# Patient Record
Sex: FEMALE | Race: WHITE | NOT HISPANIC OR LATINO | Employment: UNEMPLOYED | URBAN - METROPOLITAN AREA
[De-identification: names, ages, dates, MRNs, and addresses within clinical notes are randomized per-mention and may not be internally consistent; named-entity substitution may affect disease eponyms.]

---

## 2023-07-31 ENCOUNTER — HOSPITAL ENCOUNTER (INPATIENT)
Facility: MEDICAL CENTER | Age: 28
LOS: 3 days | DRG: 395 | End: 2023-08-03
Attending: HOSPITALIST | Admitting: HOSPITALIST
Payer: COMMERCIAL

## 2023-07-31 PROBLEM — F32.9 REACTIVE DEPRESSION: Status: ACTIVE | Noted: 2023-07-31

## 2023-07-31 PROBLEM — K80.50 CHOLEDOCHOLITHIASIS: Status: ACTIVE | Noted: 2023-07-31

## 2023-07-31 PROBLEM — E66.9 OBESITY (BMI 35.0-39.9 WITHOUT COMORBIDITY): Status: ACTIVE | Noted: 2023-07-31

## 2023-07-31 LAB — LIPASE SERPL-CCNC: >3000 U/L (ref 11–82)

## 2023-07-31 PROCEDURE — A9270 NON-COVERED ITEM OR SERVICE: HCPCS | Performed by: HOSPITALIST

## 2023-07-31 PROCEDURE — 99223 1ST HOSP IP/OBS HIGH 75: CPT | Performed by: HOSPITALIST

## 2023-07-31 PROCEDURE — 770001 HCHG ROOM/CARE - MED/SURG/GYN PRIV*

## 2023-07-31 PROCEDURE — 94760 N-INVAS EAR/PLS OXIMETRY 1: CPT

## 2023-07-31 PROCEDURE — 36415 COLL VENOUS BLD VENIPUNCTURE: CPT

## 2023-07-31 PROCEDURE — 770006 HCHG ROOM/CARE - MED/SURG/GYN SEMI*

## 2023-07-31 PROCEDURE — 83690 ASSAY OF LIPASE: CPT

## 2023-07-31 PROCEDURE — 700102 HCHG RX REV CODE 250 W/ 637 OVERRIDE(OP): Performed by: HOSPITALIST

## 2023-07-31 PROCEDURE — 700105 HCHG RX REV CODE 258: Performed by: HOSPITALIST

## 2023-07-31 PROCEDURE — 700111 HCHG RX REV CODE 636 W/ 250 OVERRIDE (IP): Mod: JZ | Performed by: INTERNAL MEDICINE

## 2023-07-31 PROCEDURE — 700111 HCHG RX REV CODE 636 W/ 250 OVERRIDE (IP): Performed by: HOSPITALIST

## 2023-07-31 RX ORDER — BISACODYL 10 MG
10 SUPPOSITORY, RECTAL RECTAL
Status: DISCONTINUED | OUTPATIENT
Start: 2023-07-31 | End: 2023-08-02

## 2023-07-31 RX ORDER — PROMETHAZINE HYDROCHLORIDE 25 MG/1
12.5-25 SUPPOSITORY RECTAL EVERY 4 HOURS PRN
Status: DISCONTINUED | OUTPATIENT
Start: 2023-07-31 | End: 2023-08-03 | Stop reason: HOSPADM

## 2023-07-31 RX ORDER — LABETALOL HYDROCHLORIDE 5 MG/ML
10 INJECTION, SOLUTION INTRAVENOUS EVERY 4 HOURS PRN
Status: DISCONTINUED | OUTPATIENT
Start: 2023-07-31 | End: 2023-08-03 | Stop reason: HOSPADM

## 2023-07-31 RX ORDER — ONDANSETRON 2 MG/ML
4 INJECTION INTRAMUSCULAR; INTRAVENOUS EVERY 4 HOURS PRN
Status: DISCONTINUED | OUTPATIENT
Start: 2023-07-31 | End: 2023-08-03 | Stop reason: HOSPADM

## 2023-07-31 RX ORDER — HYDROMORPHONE HYDROCHLORIDE 1 MG/ML
0.5 INJECTION, SOLUTION INTRAMUSCULAR; INTRAVENOUS; SUBCUTANEOUS
Status: DISCONTINUED | OUTPATIENT
Start: 2023-07-31 | End: 2023-08-03 | Stop reason: HOSPADM

## 2023-07-31 RX ORDER — MORPHINE SULFATE 4 MG/ML
4 INJECTION INTRAVENOUS
Status: DISCONTINUED | OUTPATIENT
Start: 2023-07-31 | End: 2023-07-31

## 2023-07-31 RX ORDER — SODIUM CHLORIDE 9 MG/ML
INJECTION, SOLUTION INTRAVENOUS CONTINUOUS
Status: DISCONTINUED | OUTPATIENT
Start: 2023-07-31 | End: 2023-08-02

## 2023-07-31 RX ORDER — OXYCODONE HYDROCHLORIDE 5 MG/1
5 TABLET ORAL
Status: DISCONTINUED | OUTPATIENT
Start: 2023-07-31 | End: 2023-08-03 | Stop reason: HOSPADM

## 2023-07-31 RX ORDER — MORPHINE SULFATE 4 MG/ML
4 INJECTION INTRAVENOUS ONCE
Status: COMPLETED | OUTPATIENT
Start: 2023-07-31 | End: 2023-07-31

## 2023-07-31 RX ORDER — OXYCODONE HYDROCHLORIDE 10 MG/1
10 TABLET ORAL
Status: DISCONTINUED | OUTPATIENT
Start: 2023-07-31 | End: 2023-08-03 | Stop reason: HOSPADM

## 2023-07-31 RX ORDER — PROMETHAZINE HYDROCHLORIDE 25 MG/1
12.5-25 TABLET ORAL EVERY 4 HOURS PRN
Status: DISCONTINUED | OUTPATIENT
Start: 2023-07-31 | End: 2023-08-03 | Stop reason: HOSPADM

## 2023-07-31 RX ORDER — PROCHLORPERAZINE EDISYLATE 5 MG/ML
5-10 INJECTION INTRAMUSCULAR; INTRAVENOUS EVERY 4 HOURS PRN
Status: DISCONTINUED | OUTPATIENT
Start: 2023-07-31 | End: 2023-08-03 | Stop reason: HOSPADM

## 2023-07-31 RX ORDER — POLYETHYLENE GLYCOL 3350 17 G/17G
1 POWDER, FOR SOLUTION ORAL
Status: DISCONTINUED | OUTPATIENT
Start: 2023-07-31 | End: 2023-08-02

## 2023-07-31 RX ORDER — ONDANSETRON 4 MG/1
4 TABLET, ORALLY DISINTEGRATING ORAL EVERY 4 HOURS PRN
Status: DISCONTINUED | OUTPATIENT
Start: 2023-07-31 | End: 2023-08-03 | Stop reason: HOSPADM

## 2023-07-31 RX ORDER — AMOXICILLIN 250 MG
2 CAPSULE ORAL 2 TIMES DAILY
Status: DISCONTINUED | OUTPATIENT
Start: 2023-07-31 | End: 2023-08-02

## 2023-07-31 RX ADMIN — SODIUM CHLORIDE: 9 INJECTION, SOLUTION INTRAVENOUS at 18:36

## 2023-07-31 RX ADMIN — MORPHINE SULFATE 4 MG: 4 INJECTION INTRAVENOUS at 18:31

## 2023-07-31 RX ADMIN — HYDROMORPHONE HYDROCHLORIDE 0.5 MG: 1 INJECTION, SOLUTION INTRAMUSCULAR; INTRAVENOUS; SUBCUTANEOUS at 22:41

## 2023-07-31 RX ADMIN — ONDANSETRON 4 MG: 2 INJECTION INTRAMUSCULAR; INTRAVENOUS at 19:46

## 2023-07-31 RX ADMIN — OXYCODONE HYDROCHLORIDE 10 MG: 10 TABLET ORAL at 21:05

## 2023-07-31 ASSESSMENT — ENCOUNTER SYMPTOMS
NERVOUS/ANXIOUS: 0
CONSTIPATION: 0
LOSS OF CONSCIOUSNESS: 0
CARDIOVASCULAR NEGATIVE: 1
VOMITING: 0
EYES NEGATIVE: 1
NAUSEA: 1
DIZZINESS: 0
RESPIRATORY NEGATIVE: 1
CONSTITUTIONAL NEGATIVE: 1
PALPITATIONS: 0
DIARRHEA: 0
HEADACHES: 1
FEVER: 0
DOUBLE VISION: 0
SEIZURES: 0
HEMOPTYSIS: 0
PSYCHIATRIC NEGATIVE: 1
FOCAL WEAKNESS: 0
HEARTBURN: 0
MUSCULOSKELETAL NEGATIVE: 1
BLOOD IN STOOL: 0
WHEEZING: 0
ABDOMINAL PAIN: 1
COUGH: 0
CHILLS: 0
BRUISES/BLEEDS EASILY: 0
DIAPHORESIS: 0

## 2023-07-31 ASSESSMENT — LIFESTYLE VARIABLES
ALCOHOL_USE: NO
HAVE YOU EVER FELT YOU SHOULD CUT DOWN ON YOUR DRINKING: NO
EVER FELT BAD OR GUILTY ABOUT YOUR DRINKING: NO
TOTAL SCORE: 0
HOW MANY TIMES IN THE PAST YEAR HAVE YOU HAD 5 OR MORE DRINKS IN A DAY: 0
CONSUMPTION TOTAL: NEGATIVE
AVERAGE NUMBER OF DAYS PER WEEK YOU HAVE A DRINK CONTAINING ALCOHOL: 0
TOTAL SCORE: 0
HAVE PEOPLE ANNOYED YOU BY CRITICIZING YOUR DRINKING: NO
TOTAL SCORE: 0
ON A TYPICAL DAY WHEN YOU DRINK ALCOHOL HOW MANY DRINKS DO YOU HAVE: 0
EVER HAD A DRINK FIRST THING IN THE MORNING TO STEADY YOUR NERVES TO GET RID OF A HANGOVER: NO

## 2023-07-31 ASSESSMENT — COGNITIVE AND FUNCTIONAL STATUS - GENERAL
SUGGESTED CMS G CODE MODIFIER DAILY ACTIVITY: CH
MOVING FROM LYING ON BACK TO SITTING ON SIDE OF FLAT BED: A LITTLE
STANDING UP FROM CHAIR USING ARMS: A LITTLE
SUGGESTED CMS G CODE MODIFIER MOBILITY: CK
CLIMB 3 TO 5 STEPS WITH RAILING: A LITTLE
MOBILITY SCORE: 19
WALKING IN HOSPITAL ROOM: A LITTLE
MOVING TO AND FROM BED TO CHAIR: A LITTLE
DAILY ACTIVITIY SCORE: 24

## 2023-07-31 ASSESSMENT — PATIENT HEALTH QUESTIONNAIRE - PHQ9
2. FEELING DOWN, DEPRESSED, IRRITABLE, OR HOPELESS: NOT AT ALL
1. LITTLE INTEREST OR PLEASURE IN DOING THINGS: NOT AT ALL
SUM OF ALL RESPONSES TO PHQ9 QUESTIONS 1 AND 2: 0

## 2023-07-31 ASSESSMENT — PAIN DESCRIPTION - PAIN TYPE
TYPE: SURGICAL PAIN

## 2023-07-31 ASSESSMENT — VISUAL ACUITY: OU: 1

## 2023-07-31 NOTE — PROGRESS NOTES
Triage Officer Note    Healthy 27yo s/p lap isamar with intra-op cholangiogram today at [facility] by Dr ROLAND Damico.  Found to have 1cm retained CBD stone.  Being sent for GI/ERCP

## 2023-08-01 ENCOUNTER — ANESTHESIA EVENT (OUTPATIENT)
Dept: SURGERY | Facility: MEDICAL CENTER | Age: 28
DRG: 395 | End: 2023-08-01
Payer: COMMERCIAL

## 2023-08-01 ENCOUNTER — APPOINTMENT (OUTPATIENT)
Dept: RADIOLOGY | Facility: MEDICAL CENTER | Age: 28
DRG: 395 | End: 2023-08-01
Attending: INTERNAL MEDICINE
Payer: COMMERCIAL

## 2023-08-01 ENCOUNTER — ANESTHESIA (OUTPATIENT)
Dept: SURGERY | Facility: MEDICAL CENTER | Age: 28
DRG: 395 | End: 2023-08-01
Payer: COMMERCIAL

## 2023-08-01 PROBLEM — R74.8 ELEVATED LIPASE: Status: ACTIVE | Noted: 2023-08-01

## 2023-08-01 PROBLEM — D72.829 LEUKOCYTOSIS: Status: ACTIVE | Noted: 2023-08-01

## 2023-08-01 LAB
ALBUMIN SERPL BCP-MCNC: 3.7 G/DL (ref 3.2–4.9)
ALBUMIN/GLOB SERPL: 1.3 G/DL
ALP SERPL-CCNC: 163 U/L (ref 30–99)
ALT SERPL-CCNC: 110 U/L (ref 2–50)
ANION GAP SERPL CALC-SCNC: 11 MMOL/L (ref 7–16)
AST SERPL-CCNC: 127 U/L (ref 12–45)
BILIRUB SERPL-MCNC: 3.9 MG/DL (ref 0.1–1.5)
BUN SERPL-MCNC: 6 MG/DL (ref 8–22)
CALCIUM ALBUM COR SERPL-MCNC: 8.6 MG/DL (ref 8.5–10.5)
CALCIUM SERPL-MCNC: 8.4 MG/DL (ref 8.4–10.2)
CHLORIDE SERPL-SCNC: 107 MMOL/L (ref 96–112)
CO2 SERPL-SCNC: 23 MMOL/L (ref 20–33)
CREAT SERPL-MCNC: 0.4 MG/DL (ref 0.5–1.4)
ERYTHROCYTE [DISTWIDTH] IN BLOOD BY AUTOMATED COUNT: 41.1 FL (ref 35.9–50)
ERYTHROCYTE [DISTWIDTH] IN BLOOD BY AUTOMATED COUNT: 43.1 FL (ref 35.9–50)
GFR SERPLBLD CREATININE-BSD FMLA CKD-EPI: 137 ML/MIN/1.73 M 2
GLOBULIN SER CALC-MCNC: 2.8 G/DL (ref 1.9–3.5)
GLUCOSE SERPL-MCNC: 118 MG/DL (ref 65–99)
HCG UR QL: NEGATIVE
HCT VFR BLD AUTO: 37.8 % (ref 37–47)
HCT VFR BLD AUTO: 38.6 % (ref 37–47)
HGB BLD-MCNC: 11.6 G/DL (ref 12–16)
HGB BLD-MCNC: 12.2 G/DL (ref 12–16)
MCH RBC QN AUTO: 26.7 PG (ref 27–33)
MCH RBC QN AUTO: 27 PG (ref 27–33)
MCHC RBC AUTO-ENTMCNC: 30.7 G/DL (ref 32.2–35.5)
MCHC RBC AUTO-ENTMCNC: 31.6 G/DL (ref 32.2–35.5)
MCV RBC AUTO: 84.5 FL (ref 81.4–97.8)
MCV RBC AUTO: 88.1 FL (ref 81.4–97.8)
PLATELET # BLD AUTO: 215 K/UL (ref 164–446)
PLATELET # BLD AUTO: 268 K/UL (ref 164–446)
PMV BLD AUTO: 10.2 FL (ref 9–12.9)
PMV BLD AUTO: 10.7 FL (ref 9–12.9)
POTASSIUM SERPL-SCNC: 4.5 MMOL/L (ref 3.6–5.5)
PROT SERPL-MCNC: 6.5 G/DL (ref 6–8.2)
RBC # BLD AUTO: 4.29 M/UL (ref 4.2–5.4)
RBC # BLD AUTO: 4.57 M/UL (ref 4.2–5.4)
SODIUM SERPL-SCNC: 141 MMOL/L (ref 135–145)
WBC # BLD AUTO: 12.1 K/UL (ref 4.8–10.8)
WBC # BLD AUTO: 14.4 K/UL (ref 4.8–10.8)

## 2023-08-01 PROCEDURE — 36415 COLL VENOUS BLD VENIPUNCTURE: CPT

## 2023-08-01 PROCEDURE — 80053 COMPREHEN METABOLIC PANEL: CPT

## 2023-08-01 PROCEDURE — 700105 HCHG RX REV CODE 258: Performed by: HOSPITALIST

## 2023-08-01 PROCEDURE — 700111 HCHG RX REV CODE 636 W/ 250 OVERRIDE (IP): Mod: JZ | Performed by: HOSPITALIST

## 2023-08-01 PROCEDURE — 160203 HCHG ENDO MINUTES - 1ST 30 MINS LEVEL 4: Performed by: INTERNAL MEDICINE

## 2023-08-01 PROCEDURE — 94760 N-INVAS EAR/PLS OXIMETRY 1: CPT

## 2023-08-01 PROCEDURE — C1769 GUIDE WIRE: HCPCS | Performed by: INTERNAL MEDICINE

## 2023-08-01 PROCEDURE — 700111 HCHG RX REV CODE 636 W/ 250 OVERRIDE (IP): Mod: JZ | Performed by: ANESTHESIOLOGY

## 2023-08-01 PROCEDURE — 110371 HCHG SHELL REV 272: Performed by: INTERNAL MEDICINE

## 2023-08-01 PROCEDURE — 0FL98ZZ OCCLUSION OF COMMON BILE DUCT, VIA NATURAL OR ARTIFICIAL OPENING ENDOSCOPIC: ICD-10-PCS | Performed by: INTERNAL MEDICINE

## 2023-08-01 PROCEDURE — 160002 HCHG RECOVERY MINUTES (STAT): Performed by: INTERNAL MEDICINE

## 2023-08-01 PROCEDURE — 700102 HCHG RX REV CODE 250 W/ 637 OVERRIDE(OP): Performed by: HOSPITALIST

## 2023-08-01 PROCEDURE — 160009 HCHG ANES TIME/MIN: Performed by: INTERNAL MEDICINE

## 2023-08-01 PROCEDURE — 85027 COMPLETE CBC AUTOMATED: CPT

## 2023-08-01 PROCEDURE — 160048 HCHG OR STATISTICAL LEVEL 1-5: Performed by: INTERNAL MEDICINE

## 2023-08-01 PROCEDURE — 99232 SBSQ HOSP IP/OBS MODERATE 35: CPT | Performed by: HOSPITALIST

## 2023-08-01 PROCEDURE — 700105 HCHG RX REV CODE 258: Mod: JZ | Performed by: INTERNAL MEDICINE

## 2023-08-01 PROCEDURE — 700101 HCHG RX REV CODE 250: Performed by: ANESTHESIOLOGY

## 2023-08-01 PROCEDURE — 0FC98ZZ EXTIRPATION OF MATTER FROM COMMON BILE DUCT, VIA NATURAL OR ARTIFICIAL OPENING ENDOSCOPIC: ICD-10-PCS | Performed by: INTERNAL MEDICINE

## 2023-08-01 PROCEDURE — 770001 HCHG ROOM/CARE - MED/SURG/GYN PRIV*

## 2023-08-01 PROCEDURE — 160208 HCHG ENDO MINUTES - EA ADDL 1 MIN LEVEL 4: Performed by: INTERNAL MEDICINE

## 2023-08-01 PROCEDURE — 81025 URINE PREGNANCY TEST: CPT

## 2023-08-01 PROCEDURE — 700111 HCHG RX REV CODE 636 W/ 250 OVERRIDE (IP): Mod: JZ | Performed by: INTERNAL MEDICINE

## 2023-08-01 PROCEDURE — 160035 HCHG PACU - 1ST 60 MINS PHASE I: Performed by: INTERNAL MEDICINE

## 2023-08-01 PROCEDURE — A9270 NON-COVERED ITEM OR SERVICE: HCPCS | Performed by: HOSPITALIST

## 2023-08-01 PROCEDURE — 74328 X-RAY BILE DUCT ENDOSCOPY: CPT

## 2023-08-01 PROCEDURE — 770006 HCHG ROOM/CARE - MED/SURG/GYN SEMI*

## 2023-08-01 RX ORDER — ONDANSETRON 2 MG/ML
INJECTION INTRAMUSCULAR; INTRAVENOUS PRN
Status: DISCONTINUED | OUTPATIENT
Start: 2023-08-01 | End: 2023-08-01 | Stop reason: SURG

## 2023-08-01 RX ORDER — HYDROMORPHONE HYDROCHLORIDE 1 MG/ML
0.1 INJECTION, SOLUTION INTRAMUSCULAR; INTRAVENOUS; SUBCUTANEOUS
Status: DISCONTINUED | OUTPATIENT
Start: 2023-08-01 | End: 2023-08-01 | Stop reason: HOSPADM

## 2023-08-01 RX ORDER — KETOROLAC TROMETHAMINE 30 MG/ML
INJECTION, SOLUTION INTRAMUSCULAR; INTRAVENOUS PRN
Status: DISCONTINUED | OUTPATIENT
Start: 2023-08-01 | End: 2023-08-01 | Stop reason: SURG

## 2023-08-01 RX ORDER — DEXAMETHASONE SODIUM PHOSPHATE 4 MG/ML
INJECTION, SOLUTION INTRA-ARTICULAR; INTRALESIONAL; INTRAMUSCULAR; INTRAVENOUS; SOFT TISSUE PRN
Status: DISCONTINUED | OUTPATIENT
Start: 2023-08-01 | End: 2023-08-01 | Stop reason: SURG

## 2023-08-01 RX ORDER — HYDROMORPHONE HYDROCHLORIDE 1 MG/ML
0.5 INJECTION, SOLUTION INTRAMUSCULAR; INTRAVENOUS; SUBCUTANEOUS
Status: DISCONTINUED | OUTPATIENT
Start: 2023-08-01 | End: 2023-08-01 | Stop reason: HOSPADM

## 2023-08-01 RX ORDER — IPRATROPIUM BROMIDE AND ALBUTEROL SULFATE 2.5; .5 MG/3ML; MG/3ML
3 SOLUTION RESPIRATORY (INHALATION)
Status: DISCONTINUED | OUTPATIENT
Start: 2023-08-01 | End: 2023-08-01 | Stop reason: HOSPADM

## 2023-08-01 RX ORDER — SODIUM CHLORIDE, SODIUM LACTATE, POTASSIUM CHLORIDE, CALCIUM CHLORIDE 600; 310; 30; 20 MG/100ML; MG/100ML; MG/100ML; MG/100ML
INJECTION, SOLUTION INTRAVENOUS CONTINUOUS
Status: ACTIVE | OUTPATIENT
Start: 2023-08-01 | End: 2023-08-01

## 2023-08-01 RX ORDER — OXYCODONE HCL 5 MG/5 ML
5 SOLUTION, ORAL ORAL
Status: DISCONTINUED | OUTPATIENT
Start: 2023-08-01 | End: 2023-08-01 | Stop reason: HOSPADM

## 2023-08-01 RX ORDER — ONDANSETRON 2 MG/ML
4 INJECTION INTRAMUSCULAR; INTRAVENOUS
Status: DISCONTINUED | OUTPATIENT
Start: 2023-08-01 | End: 2023-08-01 | Stop reason: HOSPADM

## 2023-08-01 RX ORDER — SODIUM CHLORIDE, SODIUM LACTATE, POTASSIUM CHLORIDE, CALCIUM CHLORIDE 600; 310; 30; 20 MG/100ML; MG/100ML; MG/100ML; MG/100ML
INJECTION, SOLUTION INTRAVENOUS CONTINUOUS
Status: DISCONTINUED | OUTPATIENT
Start: 2023-08-01 | End: 2023-08-01 | Stop reason: HOSPADM

## 2023-08-01 RX ORDER — MEPERIDINE HYDROCHLORIDE 25 MG/ML
25 INJECTION INTRAMUSCULAR; INTRAVENOUS; SUBCUTANEOUS
Status: DISCONTINUED | OUTPATIENT
Start: 2023-08-01 | End: 2023-08-01 | Stop reason: HOSPADM

## 2023-08-01 RX ORDER — OXYCODONE HCL 5 MG/5 ML
10 SOLUTION, ORAL ORAL
Status: DISCONTINUED | OUTPATIENT
Start: 2023-08-01 | End: 2023-08-01 | Stop reason: HOSPADM

## 2023-08-01 RX ORDER — HYDROMORPHONE HYDROCHLORIDE 1 MG/ML
0.2 INJECTION, SOLUTION INTRAMUSCULAR; INTRAVENOUS; SUBCUTANEOUS
Status: DISCONTINUED | OUTPATIENT
Start: 2023-08-01 | End: 2023-08-01 | Stop reason: HOSPADM

## 2023-08-01 RX ORDER — MIDAZOLAM HYDROCHLORIDE 1 MG/ML
1 INJECTION INTRAMUSCULAR; INTRAVENOUS
Status: DISCONTINUED | OUTPATIENT
Start: 2023-08-01 | End: 2023-08-01 | Stop reason: HOSPADM

## 2023-08-01 RX ORDER — DIPHENHYDRAMINE HYDROCHLORIDE 50 MG/ML
12.5 INJECTION INTRAMUSCULAR; INTRAVENOUS
Status: DISCONTINUED | OUTPATIENT
Start: 2023-08-01 | End: 2023-08-01 | Stop reason: HOSPADM

## 2023-08-01 RX ORDER — LIDOCAINE HYDROCHLORIDE 20 MG/ML
INJECTION, SOLUTION EPIDURAL; INFILTRATION; INTRACAUDAL; PERINEURAL PRN
Status: DISCONTINUED | OUTPATIENT
Start: 2023-08-01 | End: 2023-08-01 | Stop reason: SURG

## 2023-08-01 RX ADMIN — ROCURONIUM BROMIDE 50 MG: 10 INJECTION, SOLUTION INTRAVENOUS at 13:17

## 2023-08-01 RX ADMIN — SODIUM CHLORIDE: 9 INJECTION, SOLUTION INTRAVENOUS at 06:21

## 2023-08-01 RX ADMIN — MIDAZOLAM 2 MG: 1 INJECTION, SOLUTION INTRAMUSCULAR; INTRAVENOUS at 13:12

## 2023-08-01 RX ADMIN — PROPOFOL 150 MG: 10 INJECTION, EMULSION INTRAVENOUS at 13:17

## 2023-08-01 RX ADMIN — SODIUM CHLORIDE: 9 INJECTION, SOLUTION INTRAVENOUS at 00:43

## 2023-08-01 RX ADMIN — SERTRALINE HYDROCHLORIDE 25 MG: 50 TABLET ORAL at 05:03

## 2023-08-01 RX ADMIN — LIDOCAINE HYDROCHLORIDE 40 MG: 20 INJECTION, SOLUTION EPIDURAL; INFILTRATION; INTRACAUDAL at 13:17

## 2023-08-01 RX ADMIN — HYDROMORPHONE HYDROCHLORIDE 0.5 MG: 1 INJECTION, SOLUTION INTRAMUSCULAR; INTRAVENOUS; SUBCUTANEOUS at 05:04

## 2023-08-01 RX ADMIN — FENTANYL CITRATE 100 MCG: 50 INJECTION, SOLUTION INTRAMUSCULAR; INTRAVENOUS at 13:17

## 2023-08-01 RX ADMIN — KETOROLAC TROMETHAMINE 30 MG: 30 INJECTION, SOLUTION INTRAMUSCULAR; INTRAVENOUS at 13:35

## 2023-08-01 RX ADMIN — DEXAMETHASONE SODIUM PHOSPHATE 8 MG: 4 INJECTION INTRA-ARTICULAR; INTRALESIONAL; INTRAMUSCULAR; INTRAVENOUS; SOFT TISSUE at 13:35

## 2023-08-01 RX ADMIN — HYDROMORPHONE HYDROCHLORIDE 0.5 MG: 1 INJECTION, SOLUTION INTRAMUSCULAR; INTRAVENOUS; SUBCUTANEOUS at 01:46

## 2023-08-01 RX ADMIN — ONDANSETRON 4 MG: 2 INJECTION INTRAMUSCULAR; INTRAVENOUS at 09:58

## 2023-08-01 RX ADMIN — SENNOSIDES AND DOCUSATE SODIUM 2 TABLET: 50; 8.6 TABLET ORAL at 05:03

## 2023-08-01 RX ADMIN — SODIUM CHLORIDE, POTASSIUM CHLORIDE, SODIUM LACTATE AND CALCIUM CHLORIDE: 600; 310; 30; 20 INJECTION, SOLUTION INTRAVENOUS at 13:12

## 2023-08-01 RX ADMIN — ONDANSETRON 4 MG: 2 INJECTION INTRAMUSCULAR; INTRAVENOUS at 01:49

## 2023-08-01 RX ADMIN — SUGAMMADEX 200 MG: 100 INJECTION, SOLUTION INTRAVENOUS at 14:11

## 2023-08-01 RX ADMIN — SENNOSIDES AND DOCUSATE SODIUM 2 TABLET: 50; 8.6 TABLET ORAL at 18:04

## 2023-08-01 RX ADMIN — ONDANSETRON 4 MG: 2 INJECTION INTRAMUSCULAR; INTRAVENOUS at 13:35

## 2023-08-01 RX ADMIN — OXYCODONE HYDROCHLORIDE 10 MG: 10 TABLET ORAL at 00:47

## 2023-08-01 RX ADMIN — FENTANYL CITRATE 50 MCG: 50 INJECTION, SOLUTION INTRAMUSCULAR; INTRAVENOUS at 13:54

## 2023-08-01 ASSESSMENT — COGNITIVE AND FUNCTIONAL STATUS - GENERAL
DAILY ACTIVITIY SCORE: 24
SUGGESTED CMS G CODE MODIFIER DAILY ACTIVITY: CH
CLIMB 3 TO 5 STEPS WITH RAILING: A LITTLE
WALKING IN HOSPITAL ROOM: A LITTLE
MOBILITY SCORE: 22
SUGGESTED CMS G CODE MODIFIER MOBILITY: CJ

## 2023-08-01 ASSESSMENT — ENCOUNTER SYMPTOMS
EYES NEGATIVE: 1
ABDOMINAL PAIN: 1
ORTHOPNEA: 0
CONSTITUTIONAL NEGATIVE: 1
NEUROLOGICAL NEGATIVE: 1
DIZZINESS: 0
CHILLS: 0
COUGH: 0
HEMOPTYSIS: 0
RESPIRATORY NEGATIVE: 1
SPUTUM PRODUCTION: 0
VOMITING: 0
NAUSEA: 0
MUSCULOSKELETAL NEGATIVE: 1
PALPITATIONS: 0

## 2023-08-01 ASSESSMENT — PATIENT HEALTH QUESTIONNAIRE - PHQ9
2. FEELING DOWN, DEPRESSED, IRRITABLE, OR HOPELESS: NOT AT ALL
SUM OF ALL RESPONSES TO PHQ9 QUESTIONS 1 AND 2: 0
1. LITTLE INTEREST OR PLEASURE IN DOING THINGS: NOT AT ALL

## 2023-08-01 ASSESSMENT — PAIN DESCRIPTION - PAIN TYPE
TYPE: SURGICAL PAIN

## 2023-08-01 ASSESSMENT — PAIN SCALES - GENERAL: PAIN_LEVEL: 3

## 2023-08-01 NOTE — PROGRESS NOTES
Received bedside report from night RN. Patient A&Ox4. Reports 2 out of 10 abdominal pain, managed with cold packs. Discussed POC and understood. Safety and fall precaution in place. Call light placed within reach with instructions to call anytime for assistance. Will continue to monitor.

## 2023-08-01 NOTE — ANESTHESIA POSTPROCEDURE EVALUATION
Patient: Terrie Cheema Fogleman    Procedure Summary     Date: 08/01/23 Room / Location:  ENDOSCOPIC ULTRASOUND ROOM / SURGERY [facility]    Anesthesia Start: 1312 Anesthesia Stop: 1423    Procedure: ERCP (ENDOSCOPIC RETROGRADE CHOLANGIOPANCREATOGRAPHY) Diagnosis:       Choledocholithiasis      (choledocholithiasis)    Surgeons: Oswaldo Knight M.D. Responsible Provider: Jesus Morton M.D.    Anesthesia Type: general ASA Status: 1          Final Anesthesia Type: general  Last vitals  BP   Blood Pressure: 112/67    Temp   36 °C (96.8 °F)    Pulse   (!) 110   Resp   16    SpO2   93 %      Anesthesia Post Evaluation    Patient location during evaluation: PACU  Patient participation: complete - patient participated  Level of consciousness: awake and alert  Pain score: 3    Airway patency: patent  Anesthetic complications: no  Cardiovascular status: hemodynamically stable  Respiratory status: acceptable  Hydration status: euvolemic    PONV: none          No notable events documented.     Nurse Pain Score: 0 (NPRS)

## 2023-08-01 NOTE — PROGRESS NOTES
4 Eyes Skin Assessment Completed by Noemi RN and Andrei RN.    Head WDL  Ears WDL  Nose WDL  Mouth WDL  Neck WDL  Breast/Chest WDL  Shoulder Blades WDL  Spine WDL  (R) Arm/Elbow/Hand WDL  (L) Arm/Elbow/Hand WDL  Abdomen Incision  Groin WDL  Scrotum/Coccyx/Buttocks WDL  (R) Leg WDL  (L) Leg WDL  (R) Heel/Foot/Toe WDL  (L) Heel/Foot/Toe WDL          Devices In Places Blood Pressure Cuff, Pulse Ox, and Nasal Cannula      Interventions In Place NC W/Ear Foams and Pillows    Possible Skin Injury No    Pictures Uploaded Into Epic N/A  Wound Consult Placed N/A  RN Wound Prevention Protocol Ordered No

## 2023-08-01 NOTE — ANESTHESIA TIME REPORT
Anesthesia Start and Stop Event Times     Date Time Event    8/1/2023 1259 Ready for Procedure     1312 Anesthesia Start     1423 Anesthesia Stop        Responsible Staff  08/01/23    Name Role Begin End    Jesus Morton M.D. Anesth 1312 1423        Overtime Reason:  no overtime (within assigned shift)    Comments:

## 2023-08-01 NOTE — PROGRESS NOTES
Hospital Medicine Daily Progress Note    Date of Service  8/1/2023    Chief Complaint  Terrie Nesbitt is a 28 y.o. female admitted 7/31/2023 with abdominal pain    Hospital Course  Guadalupe Nesbitt was transferred from an outside medical facility after lap cholecystectomy where she was found to have a filling defect on her Intra-Op cholangiogram.  Patient was transferred to [facility] for higher level of care and availability of ERCP.    Interval Problem Update  Patient seen and examined on the medical floor after ERCP  Abdominal pain is improved since the procedure is over  We discussed reasons for bowel rest, she understands and is glad she will be able to eat ice chips  Denies nausea or vomiting since procedure  I have discussed this patient's plan of care and discharge plan at IDT rounds today with Case Management, Nursing, Nursing leadership, and other members of the IDT team.    Consultants/Specialty  GI    Code Status  Full Code    Disposition  The patient is not medically cleared for discharge to home or a post-acute facility.  Anticipate discharge to: home with close outpatient follow-up    I have placed the appropriate orders for post-discharge needs.    Review of Systems  Review of Systems   Constitutional:  Negative for chills and malaise/fatigue.   Respiratory:  Negative for cough, hemoptysis and sputum production.    Cardiovascular:  Negative for chest pain, palpitations and orthopnea.   Gastrointestinal:  Positive for abdominal pain. Negative for nausea and vomiting.   Skin:  Negative for itching and rash.   Neurological:  Negative for dizziness.   All other systems reviewed and are negative.       Physical Exam  Temp:  [36 °C (96.8 °F)-37.2 °C (98.9 °F)] 37 °C (98.6 °F)  Pulse:  [] 95  Resp:  [16-22] 17  BP: (104-138)/(44-89) 128/84  SpO2:  [91 %-100 %] 97 %    Physical Exam  Constitutional:       General: She is not in acute distress.     Appearance: Normal appearance. She  is normal weight.   HENT:      Head: Normocephalic and atraumatic.      Mouth/Throat:      Mouth: Mucous membranes are moist.      Pharynx: Oropharynx is clear.   Eyes:      Extraocular Movements: Extraocular movements intact.      Conjunctiva/sclera: Conjunctivae normal.      Pupils: Pupils are equal, round, and reactive to light.   Cardiovascular:      Rate and Rhythm: Normal rate and regular rhythm.      Pulses: Normal pulses.   Pulmonary:      Effort: Pulmonary effort is normal.      Breath sounds: Normal breath sounds.   Abdominal:      General: Abdomen is flat. Bowel sounds are normal.      Palpations: Abdomen is soft.      Tenderness: There is no guarding.      Comments: Abdomen is slightly distended  Laparoscopic incisions are clean dry and intact   Musculoskeletal:         General: Normal range of motion.      Cervical back: Normal range of motion and neck supple.   Skin:     General: Skin is warm and dry.      Capillary Refill: Capillary refill takes less than 2 seconds.      Coloration: Skin is not jaundiced.   Neurological:      General: No focal deficit present.      Mental Status: She is alert and oriented to person, place, and time.      Cranial Nerves: No cranial nerve deficit.      Gait: Gait normal.   Psychiatric:         Mood and Affect: Mood normal.         Behavior: Behavior normal.         Fluids    Intake/Output Summary (Last 24 hours) at 8/1/2023 1546  Last data filed at 8/1/2023 1500  Gross per 24 hour   Intake 1403.33 ml   Output 10 ml   Net 1393.33 ml       Laboratory  Recent Labs     08/01/23  0030   WBC 14.4*   RBC 4.57   HEMOGLOBIN 12.2   HEMATOCRIT 38.6   MCV 84.5   MCH 26.7*   MCHC 31.6*   RDW 41.1   PLATELETCT 268   MPV 10.2     Recent Labs     08/01/23  0030   SODIUM 141   POTASSIUM 4.5   CHLORIDE 107   CO2 23   GLUCOSE 118*   BUN 6*   CREATININE 0.40*   CALCIUM 8.4                   Imaging  DX-PORTABLE FLUOROSCOPY < 1 HOUR Is the patient pregnant? No   Final Result      Portable  fluoroscopy utilized for 1 minute.      INTERPRETING LOCATION: [address] [place] [address], [place], [address]      OUTSIDE IMAGES-CT ABDOMEN /PELVIS   Final Result      VT-VLJW-CPIEENC DUCTS    (Results Pending)        Assessment/Plan  * Choledocholithiasis- (present on admission)  Assessment & Plan  Status post laparoscopic cholecystectomy on 7/31/2023 at outside medical facility, Intra-Op cholangiogram demonstrated common bile duct stone that cannot be removed  Patient has been transferred to [facility] for ERCP  Now status post ERCP with sphincterotomy  At risk for post ERCP pancreatitis  IV fluids at 150 mL/h, monitor carefully for volume overload and electrolyte derangements  Pain control    Elevated lipase- (present on admission)  Assessment & Plan  At this time the patient does not have abdominal pain concerning for pancreatitis  Fluid resuscitate, bowel rest, monitor for symptoms  I have ordered follow-up labs to result to assess her response to intravenous fluid resuscitation    Leukocytosis- (present on admission)  Assessment & Plan  Likely reactive, hold off on antibiotics unless patient spikes a fever or shows other signs of infection    Obesity (BMI 35.0-39.9 without comorbidity)- (present on admission)  Assessment & Plan  Body mass index is 38.83 kg/m².    Reactive depression- (present on admission)  Assessment & Plan  Zoloft         VTE prophylaxis:    pharmacologic prophylaxis contraindicated due to bleeding risk postsphincterotomy      I have performed a physical exam and reviewed and updated ROS and Plan today (8/1/2023). In review of yesterday's note (7/31/2023), there are no changes except as documented above.

## 2023-08-01 NOTE — H&P
Hospital Medicine History & Physical Note    Date of Service  7/31/2023    Primary Care Physician  No primary care provider on file.    Consultants  GI    Specialist Names: Dr Rich Dupree    Code Status  Full Code    Chief Complaint  No chief complaint on file.      History of Presenting Illness  Terrie Nesbitt is a 28 y.o. female who presented 7/31/2023 on transfer from [facility].  Patient underwent initially a laparoscopic cholecystectomy this morning during the course of her treatment she was found to have a 1 cm retained common bile duct stone during intraoperative cholangiogram.  Patient with 10 out of 10 epigastric and right upper quadrant abdominal pain.  Patient will need fluid resuscitation n.p.o. status at midnight and ERCP hopefully tomorrow.  This was discussed with gastroenterology..    I discussed the plan of care with patient, bedside RN, and gastroenterologist .    Review of Systems  Review of Systems   Constitutional: Negative.  Negative for chills, diaphoresis and fever.   HENT: Negative.     Eyes: Negative.  Negative for double vision.   Respiratory: Negative.  Negative for cough, hemoptysis and wheezing.    Cardiovascular: Negative.  Negative for chest pain, palpitations and leg swelling.   Gastrointestinal:  Positive for abdominal pain and nausea. Negative for blood in stool, constipation, diarrhea, heartburn and vomiting.   Genitourinary: Negative.  Negative for frequency, hematuria and urgency.   Musculoskeletal: Negative.  Negative for joint pain.   Skin: Negative.  Negative for itching and rash.   Neurological:  Positive for headaches. Negative for dizziness, focal weakness, seizures and loss of consciousness.   Endo/Heme/Allergies: Negative.  Does not bruise/bleed easily.   Psychiatric/Behavioral: Negative.  Negative for suicidal ideas. The patient is not nervous/anxious.    All other systems reviewed and are negative.      Past Medical History   has no past medical  history on file.    Surgical History   has no past surgical history on file.     Family History  family history is not on file.   Family history reviewed with patient. There is no family history that is pertinent to the chief complaint.     Social History       Allergies  Allergies   Allergen Reactions    Penicillins Rash     rash       Medications  Prior to Admission Medications   Prescriptions Last Dose Informant Patient Reported? Taking?   sertraline (ZOLOFT) 50 MG Tab 7/30/2023 at unkown  Yes Yes   Sig: Take 25 mg by mouth every day.      Facility-Administered Medications: None       Physical Exam  Temp:  [36.6 °C (97.8 °F)] 36.6 °C (97.8 °F)  Pulse:  [100] 100  Resp:  [18] 18  BP: (138)/(89) 138/89  SpO2:  [91 %] 91 %  Blood Pressure: 138/89   Temperature: 36.6 °C (97.8 °F)   Pulse: 100   Respiration: 18   Pulse Oximetry: 91 %       Physical Exam  Vitals and nursing note reviewed. Exam conducted with a chaperone present.   Constitutional:       General: She is awake.      Appearance: Normal appearance. She is well-developed and well-groomed. She is obese. She is ill-appearing.   HENT:      Head: Normocephalic and atraumatic.      Jaw: There is normal jaw occlusion. No trismus.      Salivary Glands: Right salivary gland is not tender. Left salivary gland is not tender.      Right Ear: External ear normal.      Left Ear: External ear normal.      Mouth/Throat:      Mouth: Mucous membranes are moist.      Pharynx: Oropharynx is clear.   Eyes:      General: Lids are normal. Vision grossly intact.      Extraocular Movements: Extraocular movements intact.      Conjunctiva/sclera: Conjunctivae normal.      Right eye: Right conjunctiva is not injected. No exudate.     Left eye: Left conjunctiva is not injected. No exudate.     Pupils: Pupils are equal, round, and reactive to light.   Neck:      Thyroid: No thyroid mass.      Vascular: No hepatojugular reflux or JVD.      Trachea: No abnormal tracheal secretions or  tracheal deviation.   Cardiovascular:      Rate and Rhythm: Normal rate and regular rhythm. Occasional Extrasystoles are present.     Pulses: Normal pulses.      Heart sounds: Normal heart sounds. No murmur heard.     No friction rub.   Pulmonary:      Effort: Pulmonary effort is normal.      Breath sounds: Normal breath sounds. No wheezing or rhonchi.   Abdominal:      General: Abdomen is flat. There is distension.      Palpations: Abdomen is soft.      Tenderness: There is abdominal tenderness in the right upper quadrant and epigastric area. There is no right CVA tenderness or left CVA tenderness.      Hernia: No hernia is present.   Musculoskeletal:      Cervical back: Full passive range of motion without pain, normal range of motion and neck supple. No rigidity. No muscular tenderness.      Right lower leg: No edema.      Left lower leg: No edema.   Lymphadenopathy:      Head:      Right side of head: No submental adenopathy.      Left side of head: No submental adenopathy.      Cervical:      Right cervical: No superficial cervical adenopathy.     Left cervical: No superficial cervical adenopathy.      Upper Body:      Right upper body: No supraclavicular adenopathy.      Left upper body: No supraclavicular adenopathy.   Skin:     General: Skin is warm and dry.      Capillary Refill: Capillary refill takes less than 2 seconds.      Coloration: Skin is not cyanotic or pale.      Findings: No abrasion or bruising.   Neurological:      General: No focal deficit present.      Mental Status: She is alert and oriented to person, place, and time. Mental status is at baseline.      GCS: GCS eye subscore is 4. GCS verbal subscore is 5. GCS motor subscore is 6.      Cranial Nerves: No cranial nerve deficit.      Sensory: No sensory deficit.      Motor: Motor function is intact.      Deep Tendon Reflexes:      Reflex Scores:       Tricep reflexes are 2+ on the right side and 2+ on the left side.       Bicep reflexes are  2+ on the right side and 2+ on the left side.       Brachioradialis reflexes are 2+ on the right side and 2+ on the left side.       Patellar reflexes are 2+ on the right side and 2+ on the left side.       Achilles reflexes are 2+ on the right side and 2+ on the left side.  Psychiatric:         Attention and Perception: Attention and perception normal.         Mood and Affect: Mood normal.         Speech: Speech normal.         Behavior: Behavior normal. Behavior is cooperative.         Thought Content: Thought content normal.         Cognition and Memory: Cognition and memory normal.         Judgment: Judgment normal.         Laboratory:          No results for input(s): ALTSGPT, ASTSGOT, ALKPHOSPHAT, TBILIRUBIN, DBILIRUBIN, GAMMAGT, AMYLASE, LIPASE, ALB, PREALBUMIN, GLUCOSE in the last 72 hours.      No results for input(s): NTPROBNP in the last 72 hours.      No results for input(s): TROPONINT in the last 72 hours.    Imaging:  OUTSIDE IMAGES-CT ABDOMEN /PELVIS   Final Result          no X-Ray or EKG requiring interpretation    Assessment/Plan:  Justification for Admission Status  I anticipate this patient is appropriate for observation status at this time because the patient requires an ERCP for a retained common bile duct stone which will be less than 23 hours of hospital management    Patient will need a Med/Surg bed on MEDICAL service .  The need is secondary to retained common bile duct stone.    * Choledocholithiasis- (present on admission)  Assessment & Plan  Patient this morning around 11 AM in [facility] had a gallbladder removed.  During her laparoscopic cholecystectomy patient had was found to have a 1 cm common bile duct stone that could not be removed.  Patient was sent down to [facility] for ERCP  Pain management  Fluid resuscitation  N.p.o. at midnight  Discussed with Dr. Rich Dupree of gastroenterology  Patient will need to be set up for an ERCP    Reactive  depression  Assessment & Plan  Currently not suicidal homicidal  Continue Zoloft    Obesity (BMI 35.0-39.9 without comorbidity)  Assessment & Plan  Body mass index is 38.83 kg/m².  Outpatient weight loss management program and lifestyle modification highly recommended  At discharge should be set up with a bariatric clinic follow-up        VTE prophylaxis: SCDs/TEDs

## 2023-08-01 NOTE — ASSESSMENT & PLAN NOTE
Patient is at risk for posterior see pancreatitis, her abdominal pain is well controlled and lipase is trending in the right direction  Advance diet cautiously

## 2023-08-01 NOTE — PROGRESS NOTES
Pt awake rn at bedside family also at bedside  2100 pt asking for pain meds vitals taken rn at bedside  2340 Pt awake iv beeping rn at bedside  0145 pt awake rn at bedside  0355 pt sleeping with pulse ox on and family in room

## 2023-08-01 NOTE — OR NURSING
1415 : To PACU from endoscopy, report received from anesthesia and RN. Patient asleep, respirations are spontaneous and unlabored. VSS on 6L.     1430: Patient denies pain and nausea, Patient tolerating PO fluids. MD Knight at bedside. Per MD, water ok. Patient's spouse updated.     1445: No changes. Meets criteria to transfer to floor.     1500: No changes. Report to CHET Allen.

## 2023-08-01 NOTE — PROGRESS NOTES
Hourly rounding complete updated the board  Hourly rounding complete pt is laying in bed  Hourly rounding complete vitals done  Hourly rounding complete visiting with   Hourly rounding complete pt just got back from surgery  Hourly rounding complete pt is sleeping

## 2023-08-01 NOTE — CONSULTS
Gastroenterology Consult Note     Date of Consult: 7/31/23   Referring Physician: Dr. Regan     Reason for consult: CBD stone        HPI: Mrs. Nesbitt is a 29 y/o transferred from [facility] after lap cholecystectomy where she was found to have a filling defect c/w stone on IOC.  Her GB was noted to have multiple stones.  Denies f/c, though continues with abominal pains and nausea.  Patient's labs from [facility] show her T. Bili was 2.5, AST/ALT 60s.  Patient denies previous history of liver disease.  No other abdominal surgeries other than the lap isamar.       PMHX: Hx Depression     PSurgHx: Lap- isamar yesterday as above.     ALLERGIES:Penicillins     SocHx:   Social History     Socioeconomic History    Marital status: Not on file     Spouse name: Not on file    Number of children: Not on file    Years of education: Not on file    Highest education level: Not on file   Occupational History    Not on file   Tobacco Use    Smoking status: Never    Smokeless tobacco: Never   Vaping Use    Vaping Use: Never used   Substance and Sexual Activity    Alcohol use: Not on file    Drug use: Not on file    Sexual activity: Not on file   Other Topics Concern    Not on file   Social History Narrative    Not on file     Social Determinants of Health     Financial Resource Strain: Not on file   Food Insecurity: Not on file   Transportation Needs: Not on file   Physical Activity: Not on file   Stress: Not on file   Social Connections: Not on file   Intimate Partner Violence: Not on file   Housing Stability: Not on file        FAMHx: History reviewed. No pertinent family history.     ROS:  Constitutional: No fevers, chills, no night sweats, no weight changes  HEENT: no vision or hearing changes, no dry mouth, no change in smell  CARDIO: no palpitations, no orthopnea, no chest pain  PULM: no cough, no shortness of breath  NEURO: no Seizures, no memory impairment, no change in sensation  GI:  "as above  : no dysuria, no hematuria  HEME: no anemia, no easy brusing  MUSCULOSKELETAL: no muscle aches, no back pain, no arthritis  PSYCH: hx depression   SKIN: no rashes     PE:  Vitals:    07/31/23 1753   BP: 138/89   Pulse: 100   Resp: 18   Temp: 36.6 °C (97.8 °F)   TempSrc: Temporal   SpO2: 91%   Weight: 87.2 kg (192 lb 3.9 oz)   Height: 1.499 m (4' 11\")     Gen: AAOx3, appears uncomfortable, lying in bed  HEENT: PERRL, EOMI, nares patent, Mucous membranes moist  Neck: supple, no cervical or supraclavicular adenopathy  CVS: regular rhythm, normal rate, no MRG  Pulm: CTAB, no crackles  Abd: soft.  Tender around surgical site.  Ext: no edema, normal sensation  NEURO: grossly normal, no weakness  Skin: warm, no rash  Psych: normal Affect     LABS:No results found for: SODIUM, POTASSIUM, CHLORIDE, CO2, GLUCOSE, BUN, CREATININE, BUNCREATRAT, GLOMRATE   No results found for: WBC, RBC, HEMOGLOBIN, HEMATOCRIT, MCV, MCH, MCHC, MPV, NEUTSPOLYS, LYMPHOCYTES, MONOCYTES, EOSINOPHILS, BASOPHILS, HYPOCHROMIA, ANISOCYTOSIS     No results found for: PROTHROMBTM, INR            Problem List Items Addressed This Visit    None       ASSESSMENT:   CBD stone seen on IOC   Elevated LFTs c/w biliary obstruction  Hx depression     PLAN:   Proceed with ERCP- scheduled with Dr. Knight at 12 noon tomorrow.    F/U labs drawn here at [facility]  No strong indication for antibiotics at this time.  Supportive measures - analgesia, antiemetics  Discussed ERCP including risks/benefits with patient - she is agreeable to proceeding.  Orders placed in chart.      Thank you for this consult.     Rich Dupree MD   "

## 2023-08-01 NOTE — HOSPITAL COURSE
Guadalupe Nesbitt was transferred from an outside medical facility after lap cholecystectomy where she was found to have a filling defect on her Intra-Op cholangiogram.  Patient was transferred to [facility] for higher level of care and availability of ERCP.

## 2023-08-01 NOTE — PROGRESS NOTES
Patient back from OR. Alert and oriented x4 in no acute respiratory distress on room air. Reports 2 out of 10 abdominal soreness, declines intervention at this time. Safety and fall precaution in place. Call light placed within reach.

## 2023-08-01 NOTE — PROGRESS NOTES
PT arrived via Ems to floor, vitals taken, med rec partial complete. Height and weight entered by CNA. Pt oriented to room.   On call hospitalist MD Goldberg updated on direct admit arrival to floor at 1753.

## 2023-08-01 NOTE — ANESTHESIA PREPROCEDURE EVALUATION
Case: [id number] Date/Time: 08/01/23 1124    Procedure: ERCP (ENDOSCOPIC RETROGRADE CHOLANGIOPANCREATOGRAPHY)    Anesthesia type: General    Location:  ENDOSCOPIC ULTRASOUND ROOM / SURGERY [facility]    Surgeons: Oswaldo Knight M.D.          Relevant Problems   No relevant active problems       Physical Exam    Airway   Mallampati: II  TM distance: >3 FB  Neck ROM: full       Cardiovascular - normal exam  Rhythm: regular  Rate: normal  (-) murmur     Dental - normal exam           Pulmonary - normal exam  Breath sounds clear to auscultation     Abdominal    Neurological - normal exam                 Anesthesia Plan    ASA 1       Plan - general       Airway plan will be ETT          Induction: intravenous    Postoperative Plan: Postoperative administration of opioids is intended.    Pertinent diagnostic labs and testing reviewed    Informed Consent:    Anesthetic plan and risks discussed with patient.    Use of blood products discussed with: patient whom consented to blood products.

## 2023-08-01 NOTE — ASSESSMENT & PLAN NOTE
Status post ERCP with sphincterotomy  Bilirubin slow to improve  Advance diet slowly  I have ordered repeat labs for morning

## 2023-08-01 NOTE — ANESTHESIA PROCEDURE NOTES
Airway    Date/Time: 8/1/2023 1:18 PM    Performed by: Jesus Morton M.D.  Authorized by: Jesus Morton M.D.    Location:  OR  Urgency:  Elective  Indications for Airway Management:  Anesthesia      Spontaneous Ventilation: absent    Sedation Level:  Deep  Preoxygenated: Yes    Patient Position:  Sniffing  Final Airway Type:  Endotracheal airway  Final Endotracheal Airway:  ETT  Cuffed: Yes    Technique Used for Successful ETT Placement:  Direct laryngoscopy    Insertion Site:  Oral  Blade Type:  Ramos  Laryngoscope Blade/Videolaryngoscope Blade Size:  3  ETT Size (mm):  6.5  Measured from:  Teeth  ETT to Teeth (cm):  21  Placement Verified by: auscultation and capnometry    Cormack-Lehane Classification:  Grade IIa - partial view of glottis  Number of Attempts at Approach:  1

## 2023-08-01 NOTE — OR SURGEON
Post OP Note    PreOp Diagnosis: intraoperative cholangiogram    RUQ abdominal pain      PostOp Diagnosis:    EGD    Normal esophagus, GEJ at 36 cm    Normal stomach, antrum, pylorus duodenal bulb and second portion    Normal ampulla    ERCP    1/ using a clever cut sphinctertome over a 0.035 mm guidewire selective cannulation of the CBD was done with the fluoroscopy guidance   Biliary aspiration was performed to confirm appropriate positioning of the guidewire     Cholangiogram:    Dilated common bile duct to approximately 8 mm in size with a filling defect in the distal portion of the common bile duct   Sphincterotomy was performed to about 7 mm, bleeding was noted post sphincterotomy    Exchange was performed introducing a 12 mm balloon which is insufflated several sweeps of the common bile duct were performed.   Post obstructive cholangiogram demonstrated no further filling defects.   Sphincterotomy bleeding was controlled with tamponade and copious amounts of irrigation to achieve hemostasis.      Procedure(s):  ERCP (ENDOSCOPIC RETROGRADE CHOLANGIOPANCREATOGRAPHY) - Wound Class: Clean Contaminated    Surgeon(s):  Oswaldo Knight M.D.    Anesthesiologist/Type of Anesthesia:  Anesthesiologist: Jesus Morton M.D./General    Surgical Staff:  Circulator: Cholo See R.N.  Endoscopy Technician: Danica Rg    Specimens removed if any:  * No specimens in log *    Estimated Blood Loss: 10-15 cc    Findings: Prior to the procedure the patient was informed the risks and benefits of the procedure and freely signed the consent which was monitored standard monitoring blood pressure oxygen heart monitor no appreciable abnormalities were noted during the procedure.  Once adequate sedation was achieved she was placed in the prone position head rotated to the right bite-block was placed.  Introduction of the standard Olympus ERCP scope was performed under indirect visualization  through the oropharynx was achieved easily.  The entire length of the esophagus what was visualized appeared normal.  GE junction appreciated approximately 36 cm.  Intubation gastric cavity allowed evaluation of the gastric mucosa and what was visualized appeared normal as did the antrum pylorus, the duodenal bulb and second portion of the duodenum.  The ampulla Vater was brought en face.  It appeared normal.  Using a clever cut sphincterotome selective cannulation of common bile duct was achieved with the help of fluoroscopy guidance.  Aspiration was performed demonstrating appropriate positioning of the guidewire biliary aspirate noted.  Once this was performed sphincterotomy was performed to approximately 8 mm, there was some bleeding appreciated post sphincterotomy.  Exchange was performed introducing a 12 mm balloon and was advanced in the common bile duct insufflated to 12 mm.  Several sweeps of the common bile duct were performed extracting biliary debris.  Post obstructive cholangiogram demonstrated no filling defects.  Balloon tamponade was used afterwards to help control further bleeding from the sphincterotomy and hemostasis was achieved.  Copious amounts of irrigation was also performed of irrigate the ampulla.  The balloon was then removed deflated along with the guidewire no further abnormalities were noted.  The ERCP scope was then gradually straightened excess air was removed along with excess fluid from the gastric cavity and the patient tolerated the procedure well.    Complications: bleeding 10-15 cc    Recommendations    Continue IV hydration to about 150 cc an hour  N.p.o. okay for ice chips  We will hold off indomethacin at the present time given the post sphincterome  bleeding that occurred  Continue to follow H/H if there is any issues drop or concerns please call [phone]  We will continue to follow         8/1/2023 2:16 PM Oswaldo Knight M.D.

## 2023-08-01 NOTE — DISCHARGE PLANNING
Case Management Discharge Planning    Admission Date: 7/31/2023  GMLOS: 2.2  ALOS: 1    TRANSFER BACK PATIENT    Referring Facility: [facility]  Reason for Transfer: Retained bile stone post lap isamar, needing ERCP    Signed Repatriation/Transfer Back Agreement saved in .    Once this patient has received the requested service or the patient no longer requires tertiary level of care from [facility] and is stable to transfer back to referring facility, we can facilitate a transfer back. Please contact the [facility] at [phone] to coordinate this transfer request.

## 2023-08-01 NOTE — CARE PLAN
"The patient is Watcher - Medium risk of patient condition declining or worsening    Shift Goals  Clinical Goals: Pain control with current regimen, safety, monitor O2 status  Patient Goals: \"get this pain under control\"    Progress made toward(s) clinical / shift goals:  Pain medication had to be adjusted last night, doing better with Dilaudid IV. O2 stable on room air with oxygen available if needed      Problem: Knowledge Deficit - Standard  Goal: Patient and family/care givers will demonstrate understanding of plan of care, disease process/condition, diagnostic tests and medications  Outcome: Progressing     Problem: Pain - Standard  Goal: Alleviation of pain or a reduction in pain to the patient’s comfort goal  Outcome: Progressing     Problem: Psychosocial  Goal: Patient's level of anxiety will decrease  Outcome: Progressing     Problem: Communication  Goal: The ability to communicate needs accurately and effectively will improve  Outcome: Progressing     Problem: Infection - Standard  Goal: Patient will remain free from infection  Outcome: Progressing     Problem: Wound/ / Incision Healing  Goal: Patient's wound/surgical incision will decrease in size and heals properly  Outcome: Progressing     Problem: Gastrointestinal Irritability  Goal: Nausea and vomiting will be absent or improve  Outcome: Progressing     "

## 2023-08-01 NOTE — PROGRESS NOTES
[id number]-Wwrciprs report from Amrik. Direct ASdmit   1015- Pt arrived to floor via EMS transferred to hospital bed.  Assumed care of pt.   Pt is AAOX4, no signs of distress, pain scale 9/10.  Abdominal lap sites CDI PTA.  1830-Rounds by Dr. Walton.  1831-Morphine 4 mg IV stat dose.  Oxygen at 2 LPM.  Full liquids instructed. NPO post midnight.  Fall precautions in place, bed in low position.   Call light within reach.   Educated pt to call if needing anything.   Hourly rounding.   1900-Report given to night shift RN.

## 2023-08-01 NOTE — PROGRESS NOTES
1905: Received bedside report. Pt A&Ox4. Drowsy. Reviewed plan of care and fall precautions initiated.  1935: Admission assessment completed, assisted to bathroom. Pt complaining of nausea, PRN zofran given.  at bedside  2100: Pt c/o 10/10 pain. VS taken and PRN oxycodone given. Pt encouraged to relax, and distract herself while medication takes effect  2200: Pt still in severe pain. Discussed medications taken at other hospital. Pt requesting dilaudid. Will endorse to on call MD  2240: Received order for dilaudid. Pt given medication. Will continue to monitor  2305: Rounded on pt. Resting comfortably, reports pain has improved  0000: CNA in to take VS, pt resting comfortably  0043: IV machine beeping, offered to switch IV site, pt declined. New IV bag hung. Pt ambulated to bathroom. C/o pain 7/10, PRN oxycodone given  0146: Rounded on pt. Still c/o pain. Will given PRN dilaudid  0244: Pt asleep, respirations even and unlabored. Pulse 82, O2 93% on RA.  0415: Pt sleeping, respirations even and unlabored  0450: Pt awake, VS taken by CNA. Reports pain. Will give PRN medication  0504: PRN dilaudid given  0600: Pt resting comfortably, reports pain has improved

## 2023-08-01 NOTE — PROGRESS NOTES
Gastroenterology Progress Note:    Oswaldo Knight M.D.  Date & Time note created:    8/1/2023   12:50 PM     Patient ID:  Name:             Terrie Morris    YOB: 1995  Age:                 28 y.o.  female  MRN:               [id number]                                                               Chief Complaint(s):      Abdominal pain  IOC demonstrates CBD stone    History of Present Illness:    This is a very pleasant 28 y.o. female with the past medical history as listed below.      Mrs. Nesbitt is a 29 y/o transferred from [facility] after lap cholecystectomy where she was found to have a filling defect c/w stone on IOC.  Her GB was noted to have multiple stones.  Denies f/c, though continues with abominal pains and nausea.  Patient's labs from [facility] show her T. Bili was 2.5, AST/ALT 60s.  Patient denies previous history of liver disease.  No other abdominal surgeries other than the lap isamar.      Otherwise the patient is doing fine without complaints of fever/chills/weight loss/appetite change/dysphagia/odynophagia/heartburn/nausea/vomiting/bloating/constipation/diarrhea/melena/hematochezia or abdominal pain.    Review of Systems:      Constitutional: Denies fevers, weight changes  Eyes: Denies changes in vision, jaundice  Ears/Nose/Throat/Mouth: Denies nasal congestion or sore throat   Cardiovascular: Denies chest pain or palpitations   Respiratory: Denies shortness of breath, denies cough  Gastrointestinal/Hepatic:  abdominal pain, nausea, vomiting, diarrhea, constipation or GI bleeding   Genitourinary: Denies dysuria or frequency  Musculoskeletal/Rheum: Denies  joint pain and swelling, edema  Skin: Denies rash  Neurological: Denies headache, confusion, memory loss or focal weakness/parasthesias  Psychiatric: denies mood disorder   Endocrine: Eileen thyroid problems  Heme/Oncology/Lymph Nodes: Denies enlarged lymph nodes, denies brusing or known bleeding disorder  All other  systems were reviewed and are negative (AMA/CMS criteria)            Review of Systems   Constitutional: Negative.    HENT: Negative.     Eyes: Negative.    Respiratory: Negative.     Gastrointestinal:  Positive for abdominal pain.   Genitourinary: Negative.    Musculoskeletal: Negative.    Neurological: Negative.       Past Medical History:   History reviewed. No pertinent past medical history.  Active Hospital Problems    Diagnosis     Choledocholithiasis [K80.50]     Reactive depression [F32.9]     Obesity (BMI 35.0-39.9 without comorbidity) [E66.9]        Past Surgical History:  History reviewed. No pertinent surgical history.    Hospital Medications:  Current Facility-Administered Medications   Medication Dose Frequency Provider Last Rate Last Admin    lactated ringers infusion   Continuous Oswaldo Knight M.D.        [MAR Hold] sertraline (Zoloft) tablet 25 mg  25 mg DAILY Shereen Walton M.D.   25 mg at 08/01/23 0503    [MAR Hold] senna-docusate (Pericolace Or Senokot S) 8.6-50 MG per tablet 2 Tablet  2 Tablet BID Shereen Walton M.D.   2 Tablet at 08/01/23 0503    And    [MAR Hold] polyethylene glycol/lytes (Miralax) PACKET 1 Packet  1 Packet QDAY PRN Shereen Walton M.D.        And    [MAR Hold] magnesium hydroxide (Milk Of Magnesia) suspension 30 mL  30 mL QDAY PRN Shereen Walton M.D.        And    [MAR Hold] bisacodyl (Dulcolax) suppository 10 mg  10 mg QDAY PRN Shereen Walton M.D.        NS infusion   Continuous Shereen Walton M.D. 150 mL/hr at 08/01/23 0621 New Bag at 08/01/23 0621    [MAR Hold] labetalol (Normodyne/Trandate) injection 10 mg  10 mg Q4HRS PRN Shereen Walton M.D.        [MAR Hold] ondansetron (Zofran) syringe/vial injection 4 mg  4 mg Q4HRS PRN Shereen Walton M.D.   4 mg at 08/01/23 0958    [MAR Hold] ondansetron (Zofran ODT) dispertab 4 mg  4 mg Q4HRS PRN Shereen Walton M.D.        [MAR Hold] promethazine (Phenergan) tablet 12.5-25 mg  12.5-25 mg Q4HRS PRN Shereen Walton M.D.        [MAR  "Hold] promethazine (Phenergan) suppository 12.5-25 mg  12.5-25 mg Q4HRS PRN Shereen Walton M.D.        [MAR Hold] prochlorperazine (Compazine) injection 5-10 mg  5-10 mg Q4HRS PRN Shereen Walton M.D.        [MAR Hold] Pharmacy Consult Request ...Pain Management Review 1 Each  1 Each PHARMACY TO DOSE Shereen Walton M.D.        [MAR Hold] oxyCODONE immediate-release (Roxicodone) tablet 5 mg  5 mg Q3HRS PRN Shereen Walton M.D.        Or    [MAR Hold] oxyCODONE immediate release (Roxicodone) tablet 10 mg  10 mg Q3HRS PRN Shereen Walton M.D.   10 mg at 08/01/23 0047    [MAR Hold] HYDROmorphone (Dilaudid) injection 0.5 mg  0.5 mg Q3HRS PRN Adam Tucker D.O.   0.5 mg at 08/01/23 0504   Last reviewed on 8/1/2023 10:54 AM by Linette Blanton R.N.     Current Outpatient Medications:  Medications Prior to Admission   Medication Sig Dispense Refill Last Dose    sertraline (ZOLOFT) 50 MG Tab Take 25 mg by mouth every day.   7/30/2023 at 0600       Medication Allergy:  Allergies   Allergen Reactions    Penicillins Rash     rash       Physical Exam:  Weight/BMI: Body mass index is 38.83 kg/m².  /78   Pulse (!) 111   Temp 36.8 °C (98.2 °F) (Temporal)   Resp 18   Ht 1.499 m (4' 11\")   Wt 87.2 kg (192 lb 3.9 oz)   SpO2 96%   Vitals:    08/01/23 0451 08/01/23 0736 08/01/23 0846 08/01/23 1058   BP: 115/70  129/64 117/78   Pulse: 95 88 89 (!) 111   Resp: 16 18 18 18   Temp: 36.3 °C (97.4 °F)  36.6 °C (97.9 °F) 36.8 °C (98.2 °F)   TempSrc: Temporal  Temporal Temporal   SpO2: 92% 93% 98% 96%   Weight:       Height:         Oxygen Therapy:  Pulse Oximetry: 96 %, O2 (LPM): 0, O2 Delivery Device: None - Room Air    Intake/Output Summary (Last 24 hours) at 8/1/2023 1250  Last data filed at 7/31/2023 2355  Gross per 24 hour   Intake 120 ml   Output --   Net 120 ml       Constitutional:   Well developed, well nourished, no acute distress  HEENT:  Normocephalic, Atraumatic, Conjunctiva not pale, Sclera not icteric, Oropharynx " moist mucous membranes, No oral exudates, Nose normal.  No thyromegaly.  Neck:  Normal range of motion, No cervical tenderness,  no JVD.  Chest/Lungs:  Symmetric expansion, no spider angioma, breath sounds clear to auscultation bilaterally,  no crackles, no wheezing.   Cardiovascular:  Normal heart rate, Normal rhythm, No murmurs, No rubs, No gallops.    Abdomen: Bowel sounds normal, Soft, No tenderness, No guarding, No rebound, No masses, No hepatosplenomegaly.  Extremities: No cyanosis/clubbing/edema/palmar erythema/flapping tremor  Skin: Warm, Dry, No erythema, No rash, no induration.  Physical Exam  HENT:      Head: Normocephalic.      Mouth/Throat:      Mouth: Mucous membranes are moist.   Eyes:      Pupils: Pupils are equal, round, and reactive to light.   Cardiovascular:      Rate and Rhythm: Normal rate.      Heart sounds: Normal heart sounds.   Pulmonary:      Effort: Pulmonary effort is normal.      Breath sounds: Normal breath sounds.   Abdominal:      Tenderness: There is abdominal tenderness.   Skin:     Coloration: Skin is jaundiced.   Neurological:      General: No focal deficit present.      Mental Status: She is alert.         MDM (Data Review):     Records reviewed and summarized in current documentation    Lab Data Review:  Recent Results (from the past 24 hour(s))   LIPASE    Collection Time: 07/31/23  6:45 PM   Result Value Ref Range    Lipase >3000 (H) 11 - 82 U/L   CBC without Differential    Collection Time: 08/01/23 12:30 AM   Result Value Ref Range    WBC 14.4 (H) 4.8 - 10.8 K/uL    RBC 4.57 4.20 - 5.40 M/uL    Hemoglobin 12.2 12.0 - 16.0 g/dL    Hematocrit 38.6 37.0 - 47.0 %    MCV 84.5 81.4 - 97.8 fL    MCH 26.7 (L) 27.0 - 33.0 pg    MCHC 31.6 (L) 32.2 - 35.5 g/dL    RDW 41.1 35.9 - 50.0 fL    Platelet Count 268 164 - 446 K/uL    MPV 10.2 9.0 - 12.9 fL   Comp Metabolic Panel (CMP)    Collection Time: 08/01/23 12:30 AM   Result Value Ref Range    Sodium 141 135 - 145 mmol/L    Potassium  4.5 3.6 - 5.5 mmol/L    Chloride 107 96 - 112 mmol/L    Co2 23 20 - 33 mmol/L    Anion Gap 11.0 7.0 - 16.0    Glucose 118 (H) 65 - 99 mg/dL    Bun 6 (L) 8 - 22 mg/dL    Creatinine 0.40 (L) 0.50 - 1.40 mg/dL    Calcium 8.4 8.4 - 10.2 mg/dL    Correct Calcium 8.6 8.5 - 10.5 mg/dL    AST(SGOT) 127 (H) 12 - 45 U/L    ALT(SGPT) 110 (H) 2 - 50 U/L    Alkaline Phosphatase 163 (H) 30 - 99 U/L    Total Bilirubin 3.9 (H) 0.1 - 1.5 mg/dL    Albumin 3.7 3.2 - 4.9 g/dL    Total Protein 6.5 6.0 - 8.2 g/dL    Globulin 2.8 1.9 - 3.5 g/dL    A-G Ratio 1.3 g/dL   ESTIMATED GFR    Collection Time: 08/01/23 12:30 AM   Result Value Ref Range    GFR (CKD-EPI) 137 >60 mL/min/1.73 m 2   POCT urine pregnancy device results    Collection Time: 08/01/23 10:49 AM   Result Value Ref Range    POC Urine Pregnancy Test Negative        MDM (Assessment and Plan):     Active Hospital Problems    Diagnosis     Choledocholithiasis [K80.50]     Reactive depression [F32.9]     Obesity (BMI 35.0-39.9 without comorbidity) [E66.9]        Imaging/Procedures Review:    OUTSIDE IMAGES-CT ABDOMEN /PELVIS   Final Result      DX-PORTABLE FLUOROSCOPY < 1 HOUR Is the patient pregnant? No    (Results Pending)   [id number] DUCTS    (Results Pending)       Assessment    Plan  :   CBD stone seen on IOC   Elevated LFTs c/w biliary obstruction  Hx depression     PLAN:   Proceed with ERCP- scheduled    F/U labs drawn here at [facility]  No strong indication for antibiotics at this time.  Supportive measures - analgesia, antiemetics  Discussed ERCP including risks/benefits with patient - she is agreeable to proceeding.  Orders placed in chart.       Thank you very much for allowing me to participate in the care of your patient.  Please feel free to contact me anytime at [phone]     Oswaldo Knight M.D.

## 2023-08-02 LAB
ALBUMIN SERPL BCP-MCNC: 3.3 G/DL (ref 3.2–4.9)
ALBUMIN/GLOB SERPL: 1.2 G/DL
ALP SERPL-CCNC: 217 U/L (ref 30–99)
ALT SERPL-CCNC: 102 U/L (ref 2–50)
ANION GAP SERPL CALC-SCNC: 14 MMOL/L (ref 7–16)
AST SERPL-CCNC: 84 U/L (ref 12–45)
BASOPHILS # BLD AUTO: 0.2 % (ref 0–1.8)
BASOPHILS # BLD: 0.02 K/UL (ref 0–0.12)
BILIRUB SERPL-MCNC: 5.1 MG/DL (ref 0.1–1.5)
BUN SERPL-MCNC: 7 MG/DL (ref 8–22)
CALCIUM ALBUM COR SERPL-MCNC: 9 MG/DL (ref 8.5–10.5)
CALCIUM SERPL-MCNC: 8.4 MG/DL (ref 8.4–10.2)
CHLORIDE SERPL-SCNC: 105 MMOL/L (ref 96–112)
CO2 SERPL-SCNC: 19 MMOL/L (ref 20–33)
CREAT SERPL-MCNC: 0.34 MG/DL (ref 0.5–1.4)
EOSINOPHIL # BLD AUTO: 0 K/UL (ref 0–0.51)
EOSINOPHIL NFR BLD: 0 % (ref 0–6.9)
ERYTHROCYTE [DISTWIDTH] IN BLOOD BY AUTOMATED COUNT: 40.7 FL (ref 35.9–50)
GFR SERPLBLD CREATININE-BSD FMLA CKD-EPI: 143 ML/MIN/1.73 M 2
GLOBULIN SER CALC-MCNC: 2.7 G/DL (ref 1.9–3.5)
GLUCOSE SERPL-MCNC: 70 MG/DL (ref 65–99)
HCT VFR BLD AUTO: 33.7 % (ref 37–47)
HGB BLD-MCNC: 10.6 G/DL (ref 12–16)
IMM GRANULOCYTES # BLD AUTO: 0.05 K/UL (ref 0–0.11)
IMM GRANULOCYTES NFR BLD AUTO: 0.5 % (ref 0–0.9)
LIPASE SERPL-CCNC: 391 U/L (ref 11–82)
LYMPHOCYTES # BLD AUTO: 1.14 K/UL (ref 1–4.8)
LYMPHOCYTES NFR BLD: 11.7 % (ref 22–41)
MCH RBC QN AUTO: 26.7 PG (ref 27–33)
MCHC RBC AUTO-ENTMCNC: 31.5 G/DL (ref 32.2–35.5)
MCV RBC AUTO: 84.9 FL (ref 81.4–97.8)
MONOCYTES # BLD AUTO: 0.43 K/UL (ref 0–0.85)
MONOCYTES NFR BLD AUTO: 4.4 % (ref 0–13.4)
NEUTROPHILS # BLD AUTO: 8.09 K/UL (ref 1.82–7.42)
NEUTROPHILS NFR BLD: 83.2 % (ref 44–72)
NRBC # BLD AUTO: 0 K/UL
NRBC BLD-RTO: 0 /100 WBC (ref 0–0.2)
PLATELET # BLD AUTO: 240 K/UL (ref 164–446)
PMV BLD AUTO: 11 FL (ref 9–12.9)
POTASSIUM SERPL-SCNC: 3.6 MMOL/L (ref 3.6–5.5)
PROT SERPL-MCNC: 6 G/DL (ref 6–8.2)
RBC # BLD AUTO: 3.97 M/UL (ref 4.2–5.4)
SODIUM SERPL-SCNC: 138 MMOL/L (ref 135–145)
WBC # BLD AUTO: 9.7 K/UL (ref 4.8–10.8)

## 2023-08-02 PROCEDURE — 700102 HCHG RX REV CODE 250 W/ 637 OVERRIDE(OP): Performed by: HOSPITALIST

## 2023-08-02 PROCEDURE — 99232 SBSQ HOSP IP/OBS MODERATE 35: CPT | Performed by: HOSPITALIST

## 2023-08-02 PROCEDURE — 85025 COMPLETE CBC W/AUTO DIFF WBC: CPT

## 2023-08-02 PROCEDURE — A9270 NON-COVERED ITEM OR SERVICE: HCPCS | Performed by: HOSPITALIST

## 2023-08-02 PROCEDURE — 700105 HCHG RX REV CODE 258: Performed by: HOSPITALIST

## 2023-08-02 PROCEDURE — 36415 COLL VENOUS BLD VENIPUNCTURE: CPT

## 2023-08-02 PROCEDURE — 80053 COMPREHEN METABOLIC PANEL: CPT

## 2023-08-02 PROCEDURE — 770001 HCHG ROOM/CARE - MED/SURG/GYN PRIV*

## 2023-08-02 PROCEDURE — 94760 N-INVAS EAR/PLS OXIMETRY 1: CPT

## 2023-08-02 PROCEDURE — 83690 ASSAY OF LIPASE: CPT

## 2023-08-02 RX ORDER — CHOLECALCIFEROL (VITAMIN D3) 125 MCG
5 CAPSULE ORAL NIGHTLY
Status: DISCONTINUED | OUTPATIENT
Start: 2023-08-02 | End: 2023-08-03 | Stop reason: HOSPADM

## 2023-08-02 RX ADMIN — SODIUM CHLORIDE: 9 INJECTION, SOLUTION INTRAVENOUS at 00:06

## 2023-08-02 RX ADMIN — Medication 5 MG: at 21:38

## 2023-08-02 RX ADMIN — SODIUM CHLORIDE: 9 INJECTION, SOLUTION INTRAVENOUS at 04:59

## 2023-08-02 RX ADMIN — OXYCODONE HYDROCHLORIDE 10 MG: 10 TABLET ORAL at 09:03

## 2023-08-02 RX ADMIN — SERTRALINE HYDROCHLORIDE 25 MG: 50 TABLET ORAL at 04:59

## 2023-08-02 RX ADMIN — SENNOSIDES AND DOCUSATE SODIUM 2 TABLET: 50; 8.6 TABLET ORAL at 04:59

## 2023-08-02 ASSESSMENT — ENCOUNTER SYMPTOMS
CARDIOVASCULAR NEGATIVE: 1
VOMITING: 0
CONSTITUTIONAL NEGATIVE: 1
EYES NEGATIVE: 1
GASTROINTESTINAL NEGATIVE: 1
MUSCULOSKELETAL NEGATIVE: 1
HEMOPTYSIS: 0
DIZZINESS: 0
ORTHOPNEA: 0
NEUROLOGICAL NEGATIVE: 1
COUGH: 0
PALPITATIONS: 0
CHILLS: 0
NAUSEA: 0
RESPIRATORY NEGATIVE: 1
ABDOMINAL PAIN: 1

## 2023-08-02 ASSESSMENT — PAIN DESCRIPTION - PAIN TYPE
TYPE: SURGICAL PAIN
TYPE: SURGICAL PAIN
TYPE: ACUTE PAIN
TYPE: SURGICAL PAIN
TYPE: SURGICAL PAIN

## 2023-08-02 NOTE — PROGRESS NOTES
1905: Received report from day shift RN. Pt A&Ox4. Resting in bed. Reports minimal pain at this time. Reviewed plan of care for the night.  2000: Rounded on pt. Pt requesting to shower later. Continues to deny needing any PRN pain medications  2145: Pt up to bathroom to shower. Tolerated well. Linen changed  2250: IV leaking. Removed. New IV placed to L forearm by CHET Siu via ultrasound  0006: Pt asleep during rounds, Respirations even and unlabored. New IV bag hung  0105: Lab at bedside, pt resting comfortably, denies any needs  0230: Pt asleep, respirations even and unlabored.  0405: Pt up to bathroom, tolerated well. VS taken. C/o shoulder/gas pain. Heat packs provided  0500: Routine meds given. Pt resting  comfortably at this time  0630: Pt resting quietly, no needs at this time

## 2023-08-02 NOTE — CARE PLAN
The patient is Stable - Low risk of patient condition declining or worsening    Shift Goals  Clinical Goals: pain control 3/10 or less; maintain NPO status  Patient Goals: pain control  Family Goals: n/a    Progress made toward(s) clinical / shift goals:  pain controlled 3/10 or less    Patient is not progressing towards the following goals:

## 2023-08-02 NOTE — PROGRESS NOTES
Hourly rounding complete updated the board  Hourly rounding complete pt is done with eating  Hourly rounding complete vitals done  Hourly rounding complete pt wanted an update on what was going on  Hourly rounding complete pt asked for ice chips  Hourly rounding complete pt is visiting with   Hourly rounding complete pt is sitting up in bed  Hourly rounding complete pt is resting  Hourly rounding complete pt is laying in bed  Hourly rounding complete pt is taking a shower

## 2023-08-02 NOTE — CARE PLAN
The patient is Stable - Low risk of patient condition declining or worsening    Shift Goals  Clinical Goals: pain control 3/10 or less; monitor surgical wounds;  Patient Goals: rest  Family Goals: n/a    Progress made toward(s) clinical / shift goals:  pain controlled 3/10 or less; Tolerating full liquid diet    Patient is not progressing towards the following goals:

## 2023-08-02 NOTE — CARE PLAN
"The patient is Stable - Low risk of patient condition declining or worsening    Shift Goals  Clinical Goals: pain control with PO meds, pass gas, safety  Patient Goals: \"have a shower\"  Family Goals: n/a    Progress made toward(s) clinical / shift goals:  Minimal pain during the night, no PRN meds required. Pt showered last night.    Patient is not progressing towards the following goals:Has not passed gas yet, encouraged pt to walk more      Problem: Knowledge Deficit - Standard  Goal: Patient and family/care givers will demonstrate understanding of plan of care, disease process/condition, diagnostic tests and medications  Outcome: Progressing     Problem: Pain - Standard  Goal: Alleviation of pain or a reduction in pain to the patient’s comfort goal  Outcome: Progressing     Problem: Bowel Elimination  Goal: Establish and maintain regular bowel function  Outcome: Progressing     Problem: Gastrointestinal Irritability  Goal: Nausea and vomiting will be absent or improve  Outcome: Progressing     Problem: Infection - Standard  Goal: Patient will remain free from infection  Outcome: Progressing     Problem: Wound/ / Incision Healing  Goal: Patient's wound/surgical incision will decrease in size and heals properly  Outcome: Progressing     "

## 2023-08-02 NOTE — PROGRESS NOTES
Gastroenterology Progress Note:    Oswaldo Knight M.D.  Date & Time note created:    8/2/2023   12:51 PM     Patient ID:  Name:             Terrie Morris    YOB: 1995  Age:                 28 y.o.  female  MRN:               [id number]                                                               Chief Complaint(s):      CBD stones    History of Present Illness:    This is a very pleasant 28 y.o. female with the past medical history as listed below.  S/pERCP with stone extraction  Doing well with no complaints at this time, no signs of bleeding doing much better  She is hungry.      Otherwise the patient is doing fine without complaints of fever/chills/weight loss/appetite change/dysphagia/odynophagia/heartburn/nausea/vomiting/bloating/constipation/diarrhea/melena/hematochezia or abdominal pain.    Review of Systems:      Constitutional: Denies fevers, weight changes  Eyes: Denies changes in vision, jaundice  Ears/Nose/Throat/Mouth: Denies nasal congestion or sore throat   Cardiovascular: Denies chest pain or palpitations   Respiratory: Denies shortness of breath, denies cough  Gastrointestinal/Hepatic: no abdominal pain, nausea, vomiting, diarrhea, constipation or GI bleeding   Genitourinary: Denies dysuria or frequency  Musculoskeletal/Rheum: Denies  joint pain and swelling, edema  Skin: Denies rash  Neurological: Denies headache, confusion, memory loss or focal weakness/parasthesias  Psychiatric: denies mood disorder   Endocrine: Eileen thyroid problems  Heme/Oncology/Lymph Nodes: Denies enlarged lymph nodes, denies brusing or known bleeding disorder  All other systems were reviewed and are negative (AMA/CMS criteria)            Review of Systems   Constitutional: Negative.    HENT: Negative.     Eyes: Negative.    Respiratory: Negative.     Cardiovascular: Negative.    Gastrointestinal: Negative.    Genitourinary: Negative.    Musculoskeletal: Negative.    Skin: Negative.     Neurological: Negative.       Past Medical History:   History reviewed. No pertinent past medical history.  Active Hospital Problems    Diagnosis     Leukocytosis [D72.829]     Elevated lipase [R74.8]     Choledocholithiasis [K80.50]     Reactive depression [F32.9]     Obesity (BMI 35.0-39.9 without comorbidity) [E66.9]        Past Surgical History:  Past Surgical History:   Procedure Laterality Date    ME ERCP,DIAGNOSTIC N/A 8/1/2023    Procedure: ERCP (ENDOSCOPIC RETROGRADE CHOLANGIOPANCREATOGRAPHY);  Surgeon: Oswaldo Knight M.D.;  Location: SURGERY [facility];  Service: Gastroenterology       Hospital Medications:  Current Facility-Administered Medications   Medication Dose Frequency Provider Last Rate Last Admin    sertraline (Zoloft) tablet 25 mg  25 mg DAILY Shereen Walton M.D.   25 mg at 08/02/23 0459    NS infusion   Continuous Shereen Walton M.D. 150 mL/hr at 08/02/23 0459 New Bag at 08/02/23 0459    labetalol (Normodyne/Trandate) injection 10 mg  10 mg Q4HRS PRN Shereen Walton M.D.        ondansetron (Zofran) syringe/vial injection 4 mg  4 mg Q4HRS PRN Shereen Walton M.D.   4 mg at 08/01/23 0958    ondansetron (Zofran ODT) dispertab 4 mg  4 mg Q4HRS PRN Shereen Walton M.D.        promethazine (Phenergan) tablet 12.5-25 mg  12.5-25 mg Q4HRS PRN Shereen Walton M.D.        promethazine (Phenergan) suppository 12.5-25 mg  12.5-25 mg Q4HRS PRN Shereen Walton M.D.        prochlorperazine (Compazine) injection 5-10 mg  5-10 mg Q4HRS PRN Shereen Walton M.D.        Pharmacy Consult Request ...Pain Management Review 1 Each  1 Each PHARMACY TO DOSE Shereen Walton M.D.        oxyCODONE immediate-release (Roxicodone) tablet 5 mg  5 mg Q3HRS PRN Shereen Walton M.D.        Or    oxyCODONE immediate release (Roxicodone) tablet 10 mg  10 mg Q3HRS PRN Shereen Walton M.D.   10 mg at 08/02/23 0903    HYDROmorphone (Dilaudid) injection 0.5 mg  0.5 mg Q3HRS PRN Adam Tucker D.O.   0.5 mg at 08/01/23 0506   Last  "reviewed on 8/1/2023 10:54 AM by Linette Blanton R.N.     Current Outpatient Medications:  Medications Prior to Admission   Medication Sig Dispense Refill Last Dose    sertraline (ZOLOFT) 50 MG Tab Take 25 mg by mouth every day.   7/30/2023 at 0600       Medication Allergy:  Allergies   Allergen Reactions    Penicillins Rash     rash       Physical Exam:  Weight/BMI: Body mass index is 38.83 kg/m².  BP (!) 142/92   Pulse 92   Temp 36.8 °C (98.2 °F) (Temporal)   Resp 17   Ht 1.499 m (4' 11\")   Wt 87.2 kg (192 lb 3.9 oz)   SpO2 98%   Vitals:    08/02/23 0000 08/02/23 0404 08/02/23 0449 08/02/23 0847   BP: (!) 130/93 123/72  (!) 142/92   Pulse: (!) 111 92  92   Resp: 16 16 16 17   Temp: 37.4 °C (99.3 °F) 37.1 °C (98.8 °F)  36.8 °C (98.2 °F)   TempSrc: Temporal Temporal  Temporal   SpO2: 91% 96%  98%   Weight:       Height:         Oxygen Therapy:  Pulse Oximetry: 98 %, O2 (LPM): 0, O2 Delivery Device: Room air w/o2 available    Intake/Output Summary (Last 24 hours) at 8/2/2023 1251  Last data filed at 8/1/2023 1500  Gross per 24 hour   Intake 1283.33 ml   Output 10 ml   Net 1273.33 ml       Constitutional:   Well developed, well nourished, no acute distress  HEENT:  Normocephalic, Atraumatic, Conjunctiva not pale, Sclera not icteric, Oropharynx moist mucous membranes, No oral exudates, Nose normal.  No thyromegaly.  Neck:  Normal range of motion, No cervical tenderness,  no JVD.  Chest/Lungs:  Symmetric expansion, no spider angioma, breath sounds clear to auscultation bilaterally,  no crackles, no wheezing.   Cardiovascular:  Normal heart rate, Normal rhythm, No murmurs, No rubs, No gallops.    Abdomen: Bowel sounds normal, Soft, No tenderness, No guarding, No rebound, No masses, No hepatosplenomegaly.  Extremities: No cyanosis/clubbing/edema/palmar erythema/flapping tremor  Skin: Warm, Dry, No erythema, No rash, no induration.  Physical Exam  HENT:      Head: Normocephalic.      Mouth/Throat:      Mouth: " Mucous membranes are moist.   Eyes:      Pupils: Pupils are equal, round, and reactive to light.   Cardiovascular:      Rate and Rhythm: Normal rate and regular rhythm.   Pulmonary:      Effort: Pulmonary effort is normal.      Breath sounds: Normal breath sounds.   Abdominal:      General: Abdomen is flat. Bowel sounds are normal.      Palpations: Abdomen is soft.   Neurological:      General: No focal deficit present.      Mental Status: She is alert.         MDM (Data Review):     Records reviewed and summarized in current documentation    Lab Data Review:  Recent Results (from the past 24 hour(s))   CBC WITHOUT DIFFERENTIAL    Collection Time: 08/01/23  5:24 PM   Result Value Ref Range    WBC 12.1 (H) 4.8 - 10.8 K/uL    RBC 4.29 4.20 - 5.40 M/uL    Hemoglobin 11.6 (L) 12.0 - 16.0 g/dL    Hematocrit 37.8 37.0 - 47.0 %    MCV 88.1 81.4 - 97.8 fL    MCH 27.0 27.0 - 33.0 pg    MCHC 30.7 (L) 32.2 - 35.5 g/dL    RDW 43.1 35.9 - 50.0 fL    Platelet Count 215 164 - 446 K/uL    MPV 10.7 9.0 - 12.9 fL   CBC WITH DIFFERENTIAL    Collection Time: 08/02/23  1:07 AM   Result Value Ref Range    WBC 9.7 4.8 - 10.8 K/uL    RBC 3.97 (L) 4.20 - 5.40 M/uL    Hemoglobin 10.6 (L) 12.0 - 16.0 g/dL    Hematocrit 33.7 (L) 37.0 - 47.0 %    MCV 84.9 81.4 - 97.8 fL    MCH 26.7 (L) 27.0 - 33.0 pg    MCHC 31.5 (L) 32.2 - 35.5 g/dL    RDW 40.7 35.9 - 50.0 fL    Platelet Count 240 164 - 446 K/uL    MPV 11.0 9.0 - 12.9 fL    Neutrophils-Polys 83.20 (H) 44.00 - 72.00 %    Lymphocytes 11.70 (L) 22.00 - 41.00 %    Monocytes 4.40 0.00 - 13.40 %    Eosinophils 0.00 0.00 - 6.90 %    Basophils 0.20 0.00 - 1.80 %    Immature Granulocytes 0.50 0.00 - 0.90 %    Nucleated RBC 0.00 0.00 - 0.20 /100 WBC    Neutrophils (Absolute) 8.09 (H) 1.82 - 7.42 K/uL    Lymphs (Absolute) 1.14 1.00 - 4.80 K/uL    Monos (Absolute) 0.43 0.00 - 0.85 K/uL    Eos (Absolute) 0.00 0.00 - 0.51 K/uL    Baso (Absolute) 0.02 0.00 - 0.12 K/uL    Immature Granulocytes (abs) 0.05 0.00  - 0.11 K/uL    NRBC (Absolute) 0.00 K/uL   Comp Metabolic Panel    Collection Time: 08/02/23  1:07 AM   Result Value Ref Range    Sodium 138 135 - 145 mmol/L    Potassium 3.6 3.6 - 5.5 mmol/L    Chloride 105 96 - 112 mmol/L    Co2 19 (L) 20 - 33 mmol/L    Anion Gap 14.0 7.0 - 16.0    Glucose 70 65 - 99 mg/dL    Bun 7 (L) 8 - 22 mg/dL    Creatinine 0.34 (L) 0.50 - 1.40 mg/dL    Calcium 8.4 8.4 - 10.2 mg/dL    Correct Calcium 9.0 8.5 - 10.5 mg/dL    AST(SGOT) 84 (H) 12 - 45 U/L    ALT(SGPT) 102 (H) 2 - 50 U/L    Alkaline Phosphatase 217 (H) 30 - 99 U/L    Total Bilirubin 5.1 (H) 0.1 - 1.5 mg/dL    Albumin 3.3 3.2 - 4.9 g/dL    Total Protein 6.0 6.0 - 8.2 g/dL    Globulin 2.7 1.9 - 3.5 g/dL    A-G Ratio 1.2 g/dL   LIPASE    Collection Time: 08/02/23  1:07 AM   Result Value Ref Range    Lipase 391 (H) 11 - 82 U/L   ESTIMATED GFR    Collection Time: 08/02/23  1:07 AM   Result Value Ref Range    GFR (CKD-EPI) 143 >60 mL/min/1.73 m 2       MDM (Assessment and Plan):     Active Hospital Problems    Diagnosis     Leukocytosis [D72.829]     Elevated lipase [R74.8]     Choledocholithiasis [K80.50]     Reactive depression [F32.9]     Obesity (BMI 35.0-39.9 without comorbidity) [E66.9]        Imaging/Procedures Review:    DX-PORTABLE FLUOROSCOPY < 1 HOUR Is the patient pregnant? No   Final Result      Portable fluoroscopy utilized for 1 minute.      INTERPRETING LOCATION: [address] [place] [address], [id number]      [id number] DUCTS   Preliminary Result      Digitized intraoperative radiograph is submitted for review. This examination is not for diagnostic purpose but for guidance during a surgical procedure. Please see the patient's chart for full procedural details.         INTERPRETING LOCATION: [address] [place] [address], [id number]      OUTSIDE IMAGES-CT ABDOMEN /PELVIS   Final Result          Assessment    S/p ERCP    Plan    Advance diet to clears and as tolerated over the next few days  She is doing well   Follow up with PCP  We will  sign off  Call if any issues or concerns arise  [phone]    Thank you very much for allowing me to participate in the care of your patient.  Please feel free to contact me anytime at [phone]     Oswaldo Knight M.D.

## 2023-08-02 NOTE — PROGRESS NOTES
1910 pt sleeping in bed with pulse ox on  2115 pt sleeping woke up for vitals to be taken   2300 pt awake rn at bedside  0129 pt awake watching tv

## 2023-08-02 NOTE — PROGRESS NOTES
Hospital Medicine Daily Progress Note    Date of Service  8/2/2023    Chief Complaint  Terrie Nesbitt is a 28 y.o. female admitted 7/31/2023 with abdominal pain    Hospital Course  Guadalupe Nesbitt was transferred from an outside medical facility after lap cholecystectomy where she was found to have a filling defect on her Intra-Op cholangiogram.  Patient was transferred to [facility] for higher level of care and availability of ERCP.    Interval Problem Update  Mild abdominal distention, complains of some gas pain at left shoulder, overall improving, she is tolerating ice chips without nausea vomiting  Discussed with patient and her spouse, will advance diet cautiously, repeat labs in the morning and consider discharge if she continues to improve paperwork filled out for spouses employer    Total visit time = 38  minutes;  including review and discussion of today's labs and clinical progress review and discussion of GI recommendations, discussion with RN    Consultants/Specialty  GI    Code Status  Full Code    Disposition  The patient is not medically cleared for discharge to home or a post-acute facility.  Anticipate discharge to: home with close outpatient follow-up    I have placed the appropriate orders for post-discharge needs.    Review of Systems  Review of Systems   Constitutional:  Negative for chills and malaise/fatigue.   Respiratory:  Negative for cough and hemoptysis.    Cardiovascular:  Negative for chest pain, palpitations and orthopnea.   Gastrointestinal:  Positive for abdominal pain. Negative for nausea and vomiting.   Skin:  Negative for itching and rash.   Neurological:  Negative for dizziness.   All other systems reviewed and are negative.       Physical Exam  Temp:  [36 °C (96.8 °F)-37.4 °C (99.3 °F)] 36.8 °C (98.2 °F)  Pulse:  [] 92  Resp:  [16-18] 17  BP: (104-142)/(44-93) 142/92  SpO2:  [91 %-100 %] 98 %    Physical Exam  Constitutional:       General: She is not  in acute distress.     Appearance: Normal appearance. She is normal weight.   HENT:      Head: Normocephalic and atraumatic.      Mouth/Throat:      Mouth: Mucous membranes are moist.      Pharynx: Oropharynx is clear.   Eyes:      General: Scleral icterus present.      Extraocular Movements: Extraocular movements intact.      Conjunctiva/sclera: Conjunctivae normal.      Pupils: Pupils are equal, round, and reactive to light.   Cardiovascular:      Rate and Rhythm: Normal rate and regular rhythm.      Pulses: Normal pulses.   Pulmonary:      Effort: Pulmonary effort is normal. No respiratory distress.      Breath sounds: Normal breath sounds. No wheezing.   Abdominal:      General: Abdomen is flat. Bowel sounds are normal.      Palpations: Abdomen is soft.      Comments: Abdomen is slightly distended  Laparoscopic incisions are clean dry and intact   Musculoskeletal:         General: Normal range of motion.      Cervical back: Normal range of motion and neck supple.   Skin:     General: Skin is warm and dry.      Capillary Refill: Capillary refill takes less than 2 seconds.      Coloration: Skin is not jaundiced.   Neurological:      General: No focal deficit present.      Mental Status: She is alert and oriented to person, place, and time.      Cranial Nerves: No cranial nerve deficit.      Gait: Gait normal.         Fluids    Intake/Output Summary (Last 24 hours) at 8/2/2023 1344  Last data filed at 8/1/2023 1500  Gross per 24 hour   Intake 1283.33 ml   Output 10 ml   Net 1273.33 ml         Laboratory  Recent Labs     08/01/23  0030 08/01/23  1724 08/02/23  0107   WBC 14.4* 12.1* 9.7   RBC 4.57 4.29 3.97*   HEMOGLOBIN 12.2 11.6* 10.6*   HEMATOCRIT 38.6 37.8 33.7*   MCV 84.5 88.1 84.9   MCH 26.7* 27.0 26.7*   MCHC 31.6* 30.7* 31.5*   RDW 41.1 43.1 40.7   PLATELETCT 268 215 240   MPV 10.2 10.7 11.0       Recent Labs     08/01/23  0030 08/02/23  0107   SODIUM 141 138   POTASSIUM 4.5 3.6   CHLORIDE 107 105   CO2 23  19*   GLUCOSE 118* 70   BUN 6* 7*   CREATININE 0.40* 0.34*   CALCIUM 8.4 8.4                     Imaging  DX-PORTABLE FLUOROSCOPY < 1 HOUR Is the patient pregnant? No   Final Result      Portable fluoroscopy utilized for 1 minute.      INTERPRETING LOCATION: [address] [facility], [facility], [address]      [id number] DUCTS   Preliminary Result      Digitized intraoperative radiograph is submitted for review. This examination is not for diagnostic purpose but for guidance during a surgical procedure. Please see the patient's chart for full procedural details.         INTERPRETING LOCATION: [address] [facility], [facility], [address]      OUTSIDE IMAGES-CT ABDOMEN /PELVIS   Final Result             Assessment/Plan  * Choledocholithiasis- (present on admission)  Assessment & Plan  Status post ERCP with sphincterotomy  Bilirubin slow to improve  Advance diet slowly  I have ordered repeat labs for morning    Elevated lipase- (present on admission)  Assessment & Plan  Patient is at risk for posterior see pancreatitis, her abdominal pain is well controlled and lipase is trending in the right direction  Advance diet cautiously    Leukocytosis- (present on admission)  Assessment & Plan  Improved    Obesity (BMI 35.0-39.9 without comorbidity)- (present on admission)  Assessment & Plan  Body mass index is 38.83 kg/m².    Reactive depression- (present on admission)  Assessment & Plan  Zoloft         VTE prophylaxis:   SCDs/TEDs      I have performed a physical exam and reviewed and updated ROS and Plan today (8/2/2023). In review of yesterday's note (8/1/2023), there are no changes except as documented above.

## 2023-08-02 NOTE — PROGRESS NOTES
4 Eyes Skin Assessment Completed by CHET Allen and CHET Garcia.    Head WDL  Ears WDL  Nose WDL  Mouth WDL  Neck WDL  Breast/Chest WDL  Shoulder Blades WDL  Spine WDL  (R) Arm/Elbow/Hand WDL  (L) Arm/Elbow/Hand WDL  Abdomen Incision  Groin WDL  Scrotum/Coccyx/Buttocks WDL  (R) Leg WDL  (L) Leg WDL  (R) Heel/Foot/Toe WDL  (L) Heel/Foot/Toe WDL          Devices In Places Blood Pressure Cuff and Pulse Ox      Interventions In Place Pillows    Possible Skin Injury No    Pictures Uploaded Into Epic N/A  Wound Consult Placed N/A  RN Wound Prevention Protocol Ordered No

## 2023-08-03 VITALS
HEART RATE: 76 BPM | WEIGHT: 192.24 LBS | SYSTOLIC BLOOD PRESSURE: 144 MMHG | TEMPERATURE: 99.8 F | HEIGHT: 59 IN | OXYGEN SATURATION: 91 % | DIASTOLIC BLOOD PRESSURE: 86 MMHG | RESPIRATION RATE: 18 BRPM | BODY MASS INDEX: 38.76 KG/M2

## 2023-08-03 PROBLEM — D72.829 LEUKOCYTOSIS: Status: RESOLVED | Noted: 2023-08-01 | Resolved: 2023-08-03

## 2023-08-03 PROBLEM — K80.50 CHOLEDOCHOLITHIASIS: Status: RESOLVED | Noted: 2023-07-31 | Resolved: 2023-08-03

## 2023-08-03 LAB
ALBUMIN SERPL BCP-MCNC: 3.3 G/DL (ref 3.2–4.9)
ALBUMIN/GLOB SERPL: 1.4 G/DL
ALP SERPL-CCNC: 232 U/L (ref 30–99)
ALT SERPL-CCNC: 97 U/L (ref 2–50)
ANION GAP SERPL CALC-SCNC: 11 MMOL/L (ref 7–16)
AST SERPL-CCNC: 89 U/L (ref 12–45)
BILIRUB SERPL-MCNC: 3.1 MG/DL (ref 0.1–1.5)
BUN SERPL-MCNC: 4 MG/DL (ref 8–22)
CALCIUM ALBUM COR SERPL-MCNC: 8.7 MG/DL (ref 8.5–10.5)
CALCIUM SERPL-MCNC: 8.1 MG/DL (ref 8.4–10.2)
CHLORIDE SERPL-SCNC: 105 MMOL/L (ref 96–112)
CO2 SERPL-SCNC: 22 MMOL/L (ref 20–33)
CREAT SERPL-MCNC: 0.27 MG/DL (ref 0.5–1.4)
GFR SERPLBLD CREATININE-BSD FMLA CKD-EPI: 151 ML/MIN/1.73 M 2
GLOBULIN SER CALC-MCNC: 2.3 G/DL (ref 1.9–3.5)
GLUCOSE SERPL-MCNC: 129 MG/DL (ref 65–99)
POTASSIUM SERPL-SCNC: 3.2 MMOL/L (ref 3.6–5.5)
PROT SERPL-MCNC: 5.6 G/DL (ref 6–8.2)
SODIUM SERPL-SCNC: 138 MMOL/L (ref 135–145)

## 2023-08-03 PROCEDURE — 80053 COMPREHEN METABOLIC PANEL: CPT

## 2023-08-03 PROCEDURE — 99239 HOSP IP/OBS DSCHRG MGMT >30: CPT | Performed by: HOSPITALIST

## 2023-08-03 PROCEDURE — 36415 COLL VENOUS BLD VENIPUNCTURE: CPT

## 2023-08-03 PROCEDURE — 700102 HCHG RX REV CODE 250 W/ 637 OVERRIDE(OP): Performed by: HOSPITALIST

## 2023-08-03 PROCEDURE — A9270 NON-COVERED ITEM OR SERVICE: HCPCS | Performed by: HOSPITALIST

## 2023-08-03 RX ORDER — POTASSIUM CHLORIDE 20 MEQ/1
20 TABLET, EXTENDED RELEASE ORAL ONCE
Status: DISCONTINUED | OUTPATIENT
Start: 2023-08-03 | End: 2023-08-03 | Stop reason: HOSPADM

## 2023-08-03 RX ADMIN — SERTRALINE HYDROCHLORIDE 25 MG: 50 TABLET ORAL at 06:28

## 2023-08-03 ASSESSMENT — PAIN DESCRIPTION - PAIN TYPE: TYPE: SURGICAL PAIN

## 2023-08-03 NOTE — PROGRESS NOTES
Received report from night shift RN. Pt is A&O x4, full liquid diet, self. Precautions put in place bed in the lowest position and call light within light.     0700: Seen pt at bedside and updated the board.  Pt is asleep chest observed rising up/down and breathing no other needs at the moment.     0800:    0900:    1000:    1100:    1200:    1300:    1400:    1500:    1600:    1700:    1800:

## 2023-08-03 NOTE — PROGRESS NOTES
2000-Pt resting in bed.   2200-Pt resting in bed.  0000-Pt in chair.   0200-Asleep. Normal RR.  0400-Resting in bed.

## 2023-08-03 NOTE — PROGRESS NOTES
Rounding     1917 Introduction, tray removed   2115 Towels requested for shower   2236 Vitals check   2330 Temperature check   2430 Snack   0215 Patient sleeping   0524 Vitals check   0615 Patient sleeping

## 2023-08-03 NOTE — DISCHARGE SUMMARY
Discharge Summary    CHIEF COMPLAINT ON ADMISSION  No chief complaint on file.      Reason for Admission  retained bile stone     Admission Date  7/31/2023    CODE STATUS  Full Code    HPI & HOSPITAL COURSE  This is a 28 y.o. female here with abdominal pain.    Guadalupe Nesbitt was transferred from an outside medical facility after laparoscopic cholecystectomy.  During the procedure, she was found to have a filling defect on her Intra-Op cholangiogram.  Patient was transferred to [facility] for higher level of care and availability of ERCP.    On 8/1/2023 she underwent EGD and ERCP, common bile duct stone was identified, sphincterotomy performed, and multiple sweeps of the common bile duct were performed.  Postobstructive cholangiogram demonstrated no further filling deficits.    Patient recovered on the medical floor.  She has not shown any clinical signs of postoperative pancreatitis and is tolerating regular diet.  She can be discharged home today.    Therefore, she is discharged in fair and stable condition to home with close outpatient follow-up.    The patient met 2-midnight criteria for an inpatient stay at the time of discharge.    Discharge Date  8/3/2023    FOLLOW UP ITEMS POST DISCHARGE  Follow-up with primary care provider, follow-up with surgery    DISCHARGE DIAGNOSES  Principal Problem (Resolved):    Choledocholithiasis (POA: Yes)  Active Problems:    Elevated lipase (POA: Yes)    Reactive depression (POA: Yes)    Obesity (BMI 35.0-39.9 without comorbidity) (POA: Yes)  Resolved Problems:    Leukocytosis (POA: Yes)      FOLLOW UP  No future appointments.  No follow-up provider specified.    MEDICATIONS ON DISCHARGE     Medication List        CONTINUE taking these medications        Instructions   sertraline 50 MG Tabs  Commonly known as: Zoloft   Take 25 mg by mouth every day.  Dose: 25 mg              Allergies  Allergies   Allergen Reactions    Penicillins Rash     rash       DIET  Orders  Placed This Encounter   Procedures    Diet Order Diet: Low Fiber(GI Soft)     Standing Status:   Standing     Number of Occurrences:   1     Order Specific Question:   Diet:     Answer:   Low Fiber(GI Soft) [2]       ACTIVITY  As tolerated.  Weight bearing as tolerated    CONSULTATIONS  Gastroenterology    PROCEDURES  As described in hospital course    LABORATORY  Lab Results   Component Value Date    SODIUM 138 08/03/2023    POTASSIUM 3.2 (L) 08/03/2023    CHLORIDE 105 08/03/2023    CO2 22 08/03/2023    GLUCOSE 129 (H) 08/03/2023    BUN 4 (L) 08/03/2023    CREATININE 0.27 (L) 08/03/2023        Lab Results   Component Value Date    WBC 9.7 08/02/2023    HEMOGLOBIN 10.6 (L) 08/02/2023    HEMATOCRIT 33.7 (L) 08/02/2023    PLATELETCT 240 08/02/2023        Total time of the discharge process exceeds 40 minutes.

## 2023-08-03 NOTE — DISCHARGE INSTRUCTIONS
Endoscopic Retrograde Cholangiopancreatogram, Care After  The following information offers guidance on how to care for yourself after your procedure. Your health care provider may also give you more specific instructions. If you have problems or questions, contact your health care provider.  What can I expect after the procedure?  After the procedure, it is common to have:  Soreness in your throat.  Nausea.  Bloating.  Follow these instructions at home:  Medicines  Take over-the-counter and prescription medicines only as told by your health care provider.  If you were prescribed an antibiotic medicine, take it as told by your health care provider. Do not stop using the antibiotic even if you start to feel better.  General instructions    If you were given a sedative during the procedure, it can affect you for several hours. Do not drive or operate machinery until your health care provider says that it is safe.  Have a responsible adult care for you for the time you are told.  Return to your normal activities as told by your health care provider. Ask your health care provider what activities are safe for you.  Return to eating what you normally do as soon as you feel well enough or as told by your health care provider.  Keep all follow-up visits. This is important.  Contact a health care provider if:  You have pain in your abdomen that does not get better with medicine.  You develop signs of infection, such as chills or a fever.  Get help right away if:  You have difficulty swallowing.  You have worsening pain in your throat, chest, or abdomen.  You vomit bright red blood or a substance that looks like coffee grounds.  You have bloody or black, tarry stools.  You have a sudden increase in swelling (bloating) in your abdomen.  These symptoms may be an emergency. Get help right away. Call 911.  Do not wait to see if the symptoms will go away.  Do not drive yourself to the hospital.  Summary  After the procedure, it is  common to have some soreness in your throat or some bloating of your abdomen.  If you were given a sedative during the procedure, it can affect you for several hours. Do not drive or operate machinery until your health care provider says that it is safe.  Contact your health care provider if you have signs of infection, such as chills or a fever, or if you have pain that does not improve with medicine.  Get help right away if you have trouble swallowing, worsening pain, bloody or black vomit, bloody or black stools, or increased swelling in your abdomen.  This information is not intended to replace advice given to you by your health care provider. Make sure you discuss any questions you have with your health care provider.  Document Revised: 06/27/2022 Document Reviewed: 06/27/2022  [employer] Patient Education © 2023 [employer]  Minimally Invasive Cholecystectomy, Care After  What can I expect after the procedure?  After the procedure, it is common to:  Have pain at the areas of surgery. You will be given medicines for pain.  Vomit or feel like you may vomit.  Feel fullness in the belly (bloating) or have pain in the shoulder. This comes from the gas that was used during the surgery.  Follow these instructions at home:  Medicines  Take over-the-counter and prescription medicines only as told by your doctor.  If you were prescribed an antibiotic medicine, take it as told by your doctor. Do not stop taking it even if you start to feel better.  If told, take steps to prevent problems with pooping (constipation). You may need to:  Drink enough fluid to keep your pee (urine) pale yellow.  Take medicines. You will be told what medicines to take.  Eat foods that are high in fiber. These include beans, whole grains, and fresh fruits and vegetables.  Limit foods that are high in fat and sugar. These include fried or sweet foods.  Ask your doctor if you should avoid driving or using machines while you are taking your  medicine.  Incision care    Follow instructions from your doctor about how to take care of your cuts from surgery (incisions). Make sure you:  Wash your hands with soap and water for at least 20 seconds before and after you change your bandage (dressing). If you cannot use soap and water, use hand .  Change your bandage.  Leave stitches (sutures) or skin glue in place for at least 2 weeks.  Leave tape strips alone unless you are told to take them off. You may trim the edges of the tape strips if they curl up.  Do not take baths, swim, or use a hot tub. Ask your doctor about taking showers or sponge baths.  Check your incision area every day for signs of infection. Check for:  More redness, swelling, or pain.  Fluid or blood.  Warmth.  Pus or a bad smell.  Activity  Rest as told by your doctor. Do not do activities that require a lot of effort.  Get up to take short walks every 1 to 2 hours. Ask for help if you feel weak or unsteady.  Do not lift anything that is heavier than 10 lb (4.5 kg), or the limit that you are told.  Do not play contact sports until your doctor says it is okay.  Do not return to work or school until your doctor says it is okay.  Return to your normal activities when your doctor says that it is safe.  General instructions  If you were given a sedative during your procedure, do not drive or use machines until your doctor says that it is safe. A sedative is a medicine that helps you relax.  Keep all follow-up visits.  Contact a doctor if:  You get a rash.  You have more redness, swelling, or pain around your incisions.  You have fluid or blood coming from your incisions.  Your incisions feel warm to the touch.  You have pus or a bad smell coming from your incisions.  You have a fever.  One or more of your incisions breaks open.  Get help right away if:  You have trouble breathing.  You have chest pain.  You have pain that is getting worse in your shoulders.  You faint or feel dizzy when  you stand.  You have very bad pain in your belly (abdomen).  You feel like you may vomit or you vomit, and this lasts for more than one day.  You have leg pain.  These symptoms may be an emergency. Get help right away. Call 911.  Do not wait to see if the symptoms will go away.  Do not drive yourself to the hospital.  Summary  After your surgery, it is common to have pain at the areas of surgery. You may also vomit or feel fullness in the belly.  Follow your doctor's instructions about medicine, activity restrictions, and caring for your surgery areas. Do not do activities that require a lot of effort.  Contact a doctor if you have a fever or other signs of infection, such as more redness, swelling, or pain around your incisions.  Get help right away if you have chest pain, increasing pain in the shoulders, or trouble breathing.  This information is not intended to replace advice given to you by your health care provider. Make sure you discuss any questions you have with your health care provider.  Document Revised: 06/21/2022 Document Reviewed: 06/21/2022  [employer] Patient Education © 2023 [employer]

## (undated) DEVICE — ELECTRODE DUAL RETURN W/ CORD - (50/PK)

## (undated) DEVICE — TUBE SUCTION YANKAUER  1/4 X 6FT (20EA/CA)"

## (undated) DEVICE — SENSOR OXIMETER ADULT SPO2 RD SET (20EA/BX)

## (undated) DEVICE — KIT CUSTOM PROCEDURE SINGLE FOR ENDO  (15/CA)

## (undated) DEVICE — SYRINGE SAFETY 3 ML 18 GA X 1 1/2 BLUNT LL (100/BX 8BX/CA)

## (undated) DEVICE — SYRINGE DISP. 60 CC LL - (30/BX, 12BX/CA)**WHEN THESE ARE GONE ORDER #500206**

## (undated) DEVICE — GUIDE JAGWIRE 035 STRAIGHT (2EA/BX)

## (undated) DEVICE — SPHINCTEROTOME CLEVER CUT

## (undated) DEVICE — SPONGE GAUZE NON-STERILE 4X4 - (2000/CA 10PK/CA)

## (undated) DEVICE — KIT  I.V. START (100EA/CA)

## (undated) DEVICE — TUBING CLEARLINK DUO-VENT - C-FLO (48EA/CA)

## (undated) DEVICE — BLOCK BITE ENDOSCOPIC 2809 - (100/BX) INTERMEDIATE

## (undated) DEVICE — MASK WITH FACE SHIELD (25/BX 4BX/CA)

## (undated) DEVICE — FORCEP RADIAL JAW 4 STANDARD CAPACITY W/NEEDLE 240CM (40EA/BX)

## (undated) DEVICE — GOWN SURGEONS LARGE - (32/CA)

## (undated) DEVICE — SOD. CHL. INJ. 0.9% 1000 ML - (14EA/CA 60CA/PF)

## (undated) DEVICE — LACTATED RINGERS INJ 1000 ML - (14EA/CA 60CA/PF)

## (undated) DEVICE — WATER IRRIGATION STERILE 1000ML (12EA/CA)

## (undated) DEVICE — SYRINGE SAFETY 10 ML 18 GA X 1 1/2 BLUNT LL (100/BX 4BX/CA)

## (undated) DEVICE — SYRINGE 12 CC LUER TIP - (80/BX) OBSOLETE ITEM

## (undated) DEVICE — SET EXTENSION WITH 2 PORTS (48EA/CA) ***PART #2C8610 IS A SUBSTITUTE*****

## (undated) DEVICE — EXTRACTOR PRO XL 9-12 MM ABOVE

## (undated) DEVICE — TUBE E-T HI-LO CUFF 7.5MM (10EA/PK)

## (undated) DEVICE — SYRINGE SAFETY 5 ML 18 GA X 1-1/2 BLUNT LL (100/BX 4BX/CA)